# Patient Record
Sex: FEMALE | Race: WHITE | Employment: FULL TIME | URBAN - METROPOLITAN AREA
[De-identification: names, ages, dates, MRNs, and addresses within clinical notes are randomized per-mention and may not be internally consistent; named-entity substitution may affect disease eponyms.]

---

## 2017-08-15 ENCOUNTER — HOSPITAL ENCOUNTER (OUTPATIENT)
Dept: MAMMOGRAPHY | Age: 52
Discharge: HOME | End: 2017-08-15
Attending: OBSTETRICS & GYNECOLOGY
Payer: COMMERCIAL

## 2017-08-15 ENCOUNTER — HOSPITAL ENCOUNTER (OUTPATIENT)
Dept: ULTRASOUND IMAGING | Age: 52
Discharge: HOME | End: 2017-08-15
Attending: OBSTETRICS & GYNECOLOGY
Payer: COMMERCIAL

## 2017-08-15 DIAGNOSIS — Z12.31 VISIT FOR SCREENING MAMMOGRAM: ICD-10-CM

## 2017-08-15 PROCEDURE — 76642 ULTRASOUND BREAST LIMITED: CPT

## 2017-08-15 PROCEDURE — 77062 BREAST TOMOSYNTHESIS BI: CPT

## 2018-09-19 ENCOUNTER — APPOINTMENT (OUTPATIENT)
Dept: GENERAL RADIOLOGY | Age: 53
End: 2018-09-19
Attending: PHYSICIAN ASSISTANT
Payer: COMMERCIAL

## 2018-09-19 ENCOUNTER — HOSPITAL ENCOUNTER (EMERGENCY)
Age: 53
Discharge: HOME | End: 2018-09-19
Attending: EMERGENCY MEDICINE
Payer: COMMERCIAL

## 2018-09-19 VITALS
BODY MASS INDEX: 20.09 KG/M2 | DIASTOLIC BLOOD PRESSURE: 39 MMHG | WEIGHT: 128 LBS | TEMPERATURE: 99.4 F | SYSTOLIC BLOOD PRESSURE: 119 MMHG | RESPIRATION RATE: 20 BRPM | OXYGEN SATURATION: 96 % | HEART RATE: 64 BPM | HEIGHT: 67 IN

## 2018-09-19 DIAGNOSIS — S62.612A CLOSED DISPLACED FRACTURE OF PROXIMAL PHALANX OF RIGHT MIDDLE FINGER, INITIAL ENCOUNTER: Primary | ICD-10-CM

## 2018-09-19 PROCEDURE — A4565 SLINGS: HCPCS

## 2018-09-19 PROCEDURE — 73130 X-RAY EXAM OF HAND: CPT

## 2018-09-19 PROCEDURE — 74011250637 HC RX REV CODE- 250/637: Performed by: PHYSICIAN ASSISTANT

## 2018-09-19 PROCEDURE — 99284 EMERGENCY DEPT VISIT MOD MDM: CPT

## 2018-09-19 PROCEDURE — 75810000053 HC SPLINT APPLICATION

## 2018-09-19 RX ORDER — BUPIVACAINE HYDROCHLORIDE 2.5 MG/ML
10 INJECTION, SOLUTION EPIDURAL; INFILTRATION; INTRACAUDAL ONCE
Status: DISCONTINUED | OUTPATIENT
Start: 2018-09-19 | End: 2018-09-19

## 2018-09-19 RX ORDER — HYDROCODONE BITARTRATE AND ACETAMINOPHEN 5; 325 MG/1; MG/1
1 TABLET ORAL
Status: COMPLETED | OUTPATIENT
Start: 2018-09-19 | End: 2018-09-19

## 2018-09-19 RX ORDER — HYDROCODONE BITARTRATE AND ACETAMINOPHEN 5; 325 MG/1; MG/1
1 TABLET ORAL
Qty: 20 TAB | Refills: 0 | Status: SHIPPED | OUTPATIENT
Start: 2018-09-19

## 2018-09-19 RX ADMIN — HYDROCODONE BITARTRATE AND ACETAMINOPHEN 1 TABLET: 5; 325 TABLET ORAL at 20:38

## 2018-09-20 NOTE — ED PROVIDER NOTES
EMERGENCY DEPARTMENT HISTORY AND PHYSICAL EXAM    8:23 PM      Date: 9/19/2018  Patient Name: Denisse Del Cid    History of Presenting Illness     Chief Complaint   Patient presents with    Hand Injury       History Provided By: Patient    Chief Complaint: right hand injury, fall  Duration:  Hours  Timing:  Acute  Location:   Quality: Aching  Severity: Moderate  Modifying Factors: tylenol pta  Associated Symptoms: denies any other associated signs or symptoms      Additional History (Context):Britney Hooper is a 47 y.o. female who presents to the emergency department for evaluation of right third finger and hand pain after she fell forward while holding a blower. Finger is now swollen and she is experiencing intense pain. She took tylenol and applied ice pta. Pt denies any fevers or chills, headache, dizziness or light headedness, ENT issues, CP or discomfort, SOB, cough, n/v/d/c, abd pain, back pain, diaphoresis, melena/hematochezia, dysuria, hematuria, frequency, focal weakness/numbness/tingling, or rash. Patient has no other complaints at this time. PCP:  Stephy Riley MD        Current Facility-Administered Medications   Medication Dose Route Frequency Provider Last Rate Last Dose    bupivacaine (PF) (MARCAINE) 0.25 % (2.5 mg/mL) injection 25 mg  10 mL SubCUTAneous ONCE Alejandra Valdivia PA-C         Current Outpatient Prescriptions   Medication Sig Dispense Refill    HYDROcodone-acetaminophen (NORCO) 5-325 mg per tablet Take 1 Tab by mouth every four (4) hours as needed for Pain. Max Daily Amount: 6 Tabs.  20 Tab 0       Past History     Past Medical History:  Past Medical History:   Diagnosis Date    Acute frontal sinusitis     Adrenal mass (HCC)     Chronic kidney disease     Sciatica     Shortness of breath        Past Surgical History:  Past Surgical History:   Procedure Laterality Date    HX TONSILLECTOMY         Family History:  Family History   Problem Relation Age of Onset    Diabetes Mother     Heart Disease Father     Cancer Maternal Grandfather     Heart Disease Maternal Grandfather     Diabetes Maternal Grandfather     Cancer Paternal Grandmother     Lung Disease Paternal Grandmother      TB       Social History:  Social History   Substance Use Topics    Smoking status: Never Smoker    Smokeless tobacco: Never Used    Alcohol use Yes       Allergies:  No Known Allergies      Review of Systems       Review of Systems   Constitutional: Negative for chills and fever. HENT: Negative for congestion, rhinorrhea and sore throat. Respiratory: Negative for cough and shortness of breath. Cardiovascular: Negative for chest pain. Gastrointestinal: Negative for abdominal pain, blood in stool, constipation, diarrhea, nausea and vomiting. Genitourinary: Negative for dysuria, frequency and hematuria. Musculoskeletal: Positive for arthralgias and joint swelling. Negative for back pain and myalgias. Skin: Negative for rash and wound. Neurological: Negative for dizziness and headaches. Physical Exam     Visit Vitals    BP (!) 119/39 (BP 1 Location: Left arm, BP Patient Position: At rest)    Pulse 64    Temp 99.4 °F (37.4 °C)    Resp 20    Ht 5' 7\" (1.702 m)    Wt 58.1 kg (128 lb)    SpO2 96%    BMI 20.05 kg/m2         Physical Exam   Constitutional: She is oriented to person, place, and time. She appears well-developed and well-nourished. No distress. HENT:   Head: Normocephalic and atraumatic. Eyes: Conjunctivae are normal.   Neck: Normal range of motion. Neck supple. Cardiovascular: Normal rate, regular rhythm and normal heart sounds. Pulmonary/Chest: Effort normal and breath sounds normal. No respiratory distress. She has no wheezes. She has no rales. She exhibits no tenderness. Musculoskeletal: She exhibits edema and tenderness. She exhibits no deformity.    TTP with moderate edema noted to right middle finger with ecchymosis developing over MCP. Right carpal-MC joint of thumb has ecchymosis and tenderness as well. Pt is neurovascularly intact distally with cap refill < 3 seconds and intact sensation. Significantly reduced ROM 2/2 to pain. Neurological: She is alert and oriented to person, place, and time. She has normal reflexes. Skin: Skin is warm and dry. She is not diaphoretic. Psychiatric: She has a normal mood and affect. Nursing note and vitals reviewed. Diagnostic Study Results     Labs -  No results found for this or any previous visit (from the past 12 hour(s)). Radiologic Studies -   No results found. Medical Decision Making   I am the first provider for this patient. I reviewed the vital signs, available nursing notes, past medical history, past surgical history, family history and social history. Vital Signs-Reviewed the patient's vital signs. Pulse Oximetry Analysis -  96% on room air (Interpretation)    Records Reviewed: Nursing Notes and Old Medical Records (Time of Review: 8:23 PM)    ED Course: Progress Notes, Reevaluation, and Consults:      Provider Notes (Medical Decision Making):   Differential Diagnosis: fracture, dislocation, tendinous/ligamentous tear/strain, contusion, OA, RA, gout    Plan:  Pt presents ambulatory, well-hydrated, non-toxic in appearance, with normal vitals. XR shows comminuted, mildly angulated right proximal phalanx fracture. Pt discussed with personal Orthopedic surgeon, Dr. Lubna Weeks, who does not recommend reduction. He would like her to have a volar hand splint and he will see her in the morning. Will DC home with norco, splint, sling, and ortho follow-up. At this time, patient is stable and appropriate for discharge home. Patient demonstrates understanding of current diagnoses and is in agreement with the treatment plan.   They are advised that while the likelihood of serious underlying condition is low at this point given the evaluation performed today, we cannot fully rule it out. They are advised to immediately return with any new symptoms or worsening of current condition. All questions have been answered. Patient is given educational material regarding their diagnoses, including danger symptoms and when to return to the ED. Pt strongly urged to follow-up with Ortho. Diagnosis     Clinical Impression:   1. Closed displaced fracture of proximal phalanx of right middle finger, initial encounter        Disposition: IN Home    Follow-up Information     Follow up With Details Comments Contact Info    Luc Varghese MD Go to As scheduled tomorrow [facility]  [place] [address] EMERGENCY DEPT Go to As needed, If symptoms worsen [id number] [facility]  [phone]           Patient's Medications   Start Taking    HYDROCODONE-ACETAMINOPHEN (NORCO) 5-325 MG PER TABLET    Take 1 Tab by mouth every four (4) hours as needed for Pain. Max Daily Amount: 6 Tabs. Continue Taking    No medications on file   These Medications have changed    No medications on file   Stop Taking    AZITHROMYCIN (ZITHROMAX) 250 MG TABLET    Take 2 tablets today, then take 1 tablet daily    CARBAMIDE PEROXIDE (DEBROX) 6.5 % OTIC SOLUTION    Administer 5 Drops into each ear two (2) times a day.     FLUTICASONE (FLONASE) 50 MCG/ACTUATION NASAL SPRAY    nightly.     _______________________________

## 2018-09-20 NOTE — ED TRIAGE NOTES
C/o injury to right hand today following fall while holding yard blower. Bruising and swelling noted to right hand with emphasis on right 3rd finger.

## 2018-09-20 NOTE — DISCHARGE INSTRUCTIONS
Finger Fracture: Care Instructions  Your Care Instructions    Breaks in the bones of the finger usually heal well in about 3 to 4 weeks. The pain and swelling from a broken finger can last for weeks. But it should steadily improve, starting a few days after you break it. It is very important that you wear and take care of the cast or splint exactly as your doctor tells you to so that your finger heals properly and does not end up crooked. Wearing a splint may interfere with your normal activities. Ask for help with daily tasks if you need it. You heal best when you take good care of yourself. Eat a variety of healthy foods, and don't smoke. Follow-up care is a key part of your treatment and safety. Be sure to make and go to all appointments, and call your doctor if you are having problems. It's also a good idea to know your test results and keep a list of the medicines you take. How can you care for yourself at home? · If your doctor put a splint on your finger, wear the splint exactly as directed. Do not remove it until your doctor says that you can. · Keep your hand raised above the level of your heart as much as you can. This will help reduce swelling. · Put ice or a cold pack on your finger for 10 to 20 minutes at a time. Try to do this every 1 to 2 hours for the next 3 days (when you are awake) or until the swelling goes down. Put a thin cloth between the ice and your skin. Keep the splint dry. · Be safe with medicines. Take pain medicines exactly as directed. ¨ If the doctor gave you a prescription medicine for pain, take it as prescribed. ¨ If you are not taking a prescription pain medicine, ask your doctor if you can take an over-the-counter medicine. When should you call for help? Call 911 anytime you think you may need emergency care.  For example, call if:    · Your finger is cool or pale or changes color.    Call your doctor now or seek immediate medical care if:    · Your pain gets much worse.     · You have tingling, weakness, or numbness in your finger.     · You have signs of infection, such as:  ¨ Increased pain, swelling, warmth, or redness. ¨ Red streaks leading from the area. ¨ Pus draining from the area. ¨ Swollen lymph nodes in your neck, armpits, or groin. ¨ A fever.    Watch closely for changes in your health, and be sure to contact your doctor if:    · Your finger is not steadily improving. Where can you learn more? Go to http://heriberto-natalio.info/. Enter Q497 in the search box to learn more about \"Finger Fracture: Care Instructions. \"  Current as of: November 29, 2017  Content Version: 11.7  © 6081-4672 [employer], [employer]. Care instructions adapted under license by [employer] (which disclaims liability or warranty for this information). If you have questions about a medical condition or this instruction, always ask your healthcare professional. [place] [address] any warranty or liability for your use of this information.

## 2018-09-20 NOTE — ED NOTES
Patient armband removed and shreddedI have reviewed discharge instructions with the patient. The patient verbalized understanding.  rx x 1 given

## 2021-10-18 ENCOUNTER — TRANSCRIBE ORDER (OUTPATIENT)
Dept: SCHEDULING | Age: 56
End: 2021-10-18

## 2021-10-18 DIAGNOSIS — Z12.31 SCREENING MAMMOGRAM FOR HIGH-RISK PATIENT: Primary | ICD-10-CM

## 2021-10-21 ENCOUNTER — HOSPITAL ENCOUNTER (OUTPATIENT)
Dept: MAMMOGRAPHY | Age: 56
Discharge: HOME | End: 2021-10-21
Attending: OBSTETRICS & GYNECOLOGY
Payer: COMMERCIAL

## 2021-10-21 DIAGNOSIS — Z12.31 SCREENING MAMMOGRAM FOR HIGH-RISK PATIENT: ICD-10-CM

## 2021-10-21 PROCEDURE — 77063 BREAST TOMOSYNTHESIS BI: CPT

## 2022-11-02 ENCOUNTER — HOSPITAL ENCOUNTER (OUTPATIENT)
Dept: GENERAL RADIOLOGY | Age: 57
Discharge: HOME | End: 2022-11-02
Payer: COMMERCIAL

## 2022-11-02 ENCOUNTER — OFFICE VISIT (OUTPATIENT)
Dept: ORTHOPEDIC SURGERY | Age: 57
End: 2022-11-02
Payer: COMMERCIAL

## 2022-11-02 VITALS — HEIGHT: 67 IN | BODY MASS INDEX: 22.6 KG/M2 | WEIGHT: 144 LBS

## 2022-11-02 DIAGNOSIS — M25.511 RIGHT SHOULDER PAIN, UNSPECIFIED CHRONICITY: Primary | ICD-10-CM

## 2022-11-02 DIAGNOSIS — M25.511 RIGHT SHOULDER PAIN, UNSPECIFIED CHRONICITY: ICD-10-CM

## 2022-11-02 PROCEDURE — 73030 X-RAY EXAM OF SHOULDER: CPT

## 2022-11-02 PROCEDURE — 99203 OFFICE O/P NEW LOW 30 MIN: CPT | Performed by: ORTHOPAEDIC SURGERY

## 2022-11-02 RX ORDER — ALBUTEROL SULFATE 90 UG/1
AEROSOL, METERED RESPIRATORY (INHALATION)
COMMUNITY
Start: 2022-09-21

## 2022-11-02 RX ORDER — TRAMADOL HYDROCHLORIDE 50 MG/1
50 TABLET ORAL
COMMUNITY
End: 2022-11-11 | Stop reason: ALTCHOICE

## 2022-11-02 RX ORDER — ZALEPLON 5 MG/1
CAPSULE ORAL
COMMUNITY
Start: 2022-08-05

## 2022-11-02 RX ORDER — CLARITHROMYCIN 500 MG/1
500 TABLET, FILM COATED ORAL EVERY 12 HOURS
COMMUNITY
End: 2022-11-11 | Stop reason: ALTCHOICE

## 2022-11-02 NOTE — PATIENT INSTRUCTIONS
Shoulder Pain: Care Instructions  Your Care Instructions     You can hurt your shoulder by using it too much during an activity, such as fishing or baseball. It can also happen as part of the everyday wear and tear of getting older. Shoulder injuries can be slow to heal, but your shoulder should get better with time. Your doctor may recommend a sling to rest your shoulder. If you have injured your shoulder, you may need testing and treatment. Follow-up care is a key part of your treatment and safety. Be sure to make and go to all appointments, and call your doctor if you are having problems. It's also a good idea to know your test results and keep a list of the medicines you take. How can you care for yourself at home? Take pain medicines exactly as directed. If the doctor gave you a prescription medicine for pain, take it as prescribed. If you are not taking a prescription pain medicine, ask your doctor if you can take an over-the-counter medicine. Do not take two or more pain medicines at the same time unless the doctor told you to. Many pain medicines contain acetaminophen, which is Tylenol. Too much acetaminophen (Tylenol) can be harmful. If your doctor recommends that you wear a sling, use it as directed. Do not take it off before your doctor tells you to. Put ice or a cold pack on the sore area for 10 to 20 minutes at a time. Put a thin cloth between the ice and your skin. If there is no swelling, you can put moist heat, a heating pad, or a warm cloth on your shoulder. Some doctors suggest alternating between hot and cold. Rest your shoulder for a few days. If your doctor recommends it, you can then begin gentle exercise of the shoulder, but do not lift anything heavy. When should you call for help? Call 911 anytime you think you may need emergency care. For example, call if:    You have chest pain or pressure. This may occur with:  Sweating. Shortness of breath. Nausea or vomiting.   Pain that spreads from the chest to the neck, jaw, or one or both shoulders or arms. Dizziness or lightheadedness. A fast or uneven pulse. After calling 911, chew 1 adult-strength aspirin. Wait for an ambulance. Do not try to drive yourself. Your arm or hand is cool or pale or changes color. Call your doctor now or seek immediate medical care if:    You have signs of infection, such as: Increased pain, swelling, warmth, or redness in your shoulder. Red streaks leading from a place on your shoulder. Pus draining from an area of your shoulder. Swollen lymph nodes in your neck, armpits, or groin. A fever. Watch closely for changes in your health, and be sure to contact your doctor if:    You cannot use your shoulder. Your shoulder does not get better as expected. Where can you learn more? Go to http://www.gaston.com/  Enter H996 in the search box to learn more about \"Shoulder Pain: Care Instructions. \"  Current as of: March 9, 2022               Content Version: 13.4  © 2006-2022 [employer]. Care instructions adapted under license by [employer] (which disclaims liability or warranty for this information). If you have questions about a medical condition or this instruction, always ask your healthcare professional. [address] any warranty or liability for your use of this information.

## 2022-11-02 NOTE — PROGRESS NOTES
Name: Pool Elizondo    : 1965     Service Dept: [address] [place] [address] [facility]    Chief Complaint   Patient presents with    Shoulder Pain        Visit Vitals  Ht 5' 7\" (1.702 m)   Wt 144 lb (65.3 kg)   LMP 2013   BMI 22.55 kg/m²        No Known Allergies     Current Outpatient Medications   Medication Sig Dispense Refill    albuterol (PROVENTIL HFA, VENTOLIN HFA, PROAIR HFA) 90 mcg/actuation inhaler INHALE 1 SPRAY BY MOUTH EVERY 4 TO 6 HOURS AS NEEDED FOR SHORTNESS OF BREATH OR WHEEZING. clarithromycin (BIAXIN) 500 mg tablet Take 500 mg by mouth every twelve (12) hours. traMADoL (ULTRAM) 50 mg tablet Take 50 mg by mouth every six (6) hours as needed. zaleplon (SONATA) 5 mg capsule TAKE 1 CAPSULE BY MOUTH AT BEDTIME      HYDROcodone-acetaminophen (NORCO) 5-325 mg per tablet Take 1 Tab by mouth every four (4) hours as needed for Pain. Max Daily Amount: 6 Tabs. 20 Tab 0      Patient Active Problem List   Diagnosis Code    Chronic kidney disease N18.9      Family History   Problem Relation Age of Onset    Diabetes Mother     Heart Disease Father     Cancer Maternal Grandfather     Heart Disease Maternal Grandfather     Diabetes Maternal Grandfather     Cancer Paternal Grandmother     Lung Disease Paternal Grandmother         TB      Social History     Socioeconomic History    Marital status:    Tobacco Use    Smoking status: Former     Types: Cigarettes     Quit date: 2010     Years since quittin.8    Smokeless tobacco: Never   Substance and Sexual Activity    Alcohol use:  Yes     Alcohol/week: 3.0 standard drinks     Types: 3 Glasses of wine per week    Drug use: No    Sexual activity: Yes     Partners: Female   Social History Narrative    ** Merged History Encounter **           Past Surgical History:   Procedure Laterality Date    HX OPEN REDUCTION INTERNAL FIXATION      right middle finger    HX TONSILLECTOMY        Past Medical History:   Diagnosis Date    Acute frontal sinusitis     Adrenal mass (HCC)     Chronic kidney disease     Nausea & vomiting     Sciatica     Shortness of breath         I have reviewed and agree with PFSH and ROS and intake form in chart and the record furthermore I have reviewed prior medical record(s) regarding this patients care during this appointment. Review of Systems:   Patient is a pleasant appearing individual, appropriately dressed, well hydrated, well nourished, who is alert, appropriately oriented for age, and in no acute distress with a normal gait and normal affect who does not appear to be in any significant pain. Physical Exam:  Right Shoulder - Positive \"empty can\" test, Grossly neurovascularly intact. Range of motion-Full passive, Active with impingement. No Point tenderness, Strength-significant weakness noted with abduction, some mild crepitation, No skin lesion are identified, No instabilty is noted, No apprehension. No Swelling. Left Shoulder - grossly neurovascularly intact. Full Range of motion, No Weakness with abduction, No Point Tenderness, No skin lesion are identified, No instabilty is noted, No apprehension. No cuts or abrasions are identified. Encounter Diagnoses     ICD-10-CM ICD-9-CM   1. Right shoulder pain, unspecified chronicity  M25.511 719.41       HPI:  The patient is here with a chief complaint of right shoulder pain, throbbing, burning pain status post trauma and fell on the right shoulder. Pain is 7/10. X-rays are unremarkable. Assessment/Plan:  1. Right shoulder possible rotator cuff pathology. I would like to go ahead and get an MRI of the right shoulder. Continue conservative treatment. We will see her back as needed in the meantime and we will go from there. As part of continued conservative pain management options the patient was advised to utilize Tylenol or OTC NSAIDS as long as it is not medically contraindicated.      Return to Office: Follow-up and Dispositions    Return for POST MRI. Scribed by Meghan Dukes LPN as dictated by [facility] PETE Alan MD.  Documentation True and Accepted [facility] PETE Alan MD

## 2022-11-02 NOTE — LETTER
11/4/2022    Patient: Kartik File   YOB: 1965   Date of Visit: 11/2/2022     Joaquin Ramirez [phone] [facility] [id number]  Via Fax: [phone]    Dear Yamileth Renteria DO,      Thank you for referring Ms. Trevor Hammer to [facility] [place] [address] for evaluation. My notes for this consultation are attached. If you have questions, please do not hesitate to call me. I look forward to following your patient along with you.       Sincerely,    José Henning MD

## 2022-11-08 ENCOUNTER — HOSPITAL ENCOUNTER (OUTPATIENT)
Age: 57
Discharge: HOME | End: 2022-11-08
Payer: COMMERCIAL

## 2022-11-08 DIAGNOSIS — M25.511 RIGHT SHOULDER PAIN, UNSPECIFIED CHRONICITY: ICD-10-CM

## 2022-11-08 PROCEDURE — 73221 MRI JOINT UPR EXTREM W/O DYE: CPT

## 2022-11-11 ENCOUNTER — OFFICE VISIT (OUTPATIENT)
Dept: ORTHOPEDIC SURGERY | Age: 57
End: 2022-11-11
Payer: COMMERCIAL

## 2022-11-11 DIAGNOSIS — M25.511 RIGHT SHOULDER PAIN, UNSPECIFIED CHRONICITY: ICD-10-CM

## 2022-11-11 DIAGNOSIS — M75.121 COMPLETE TEAR OF RIGHT ROTATOR CUFF, UNSPECIFIED WHETHER TRAUMATIC: Primary | ICD-10-CM

## 2022-11-11 DIAGNOSIS — M75.121 COMPLETE TEAR OF RIGHT ROTATOR CUFF, UNSPECIFIED WHETHER TRAUMATIC: ICD-10-CM

## 2022-11-11 PROCEDURE — 99214 OFFICE O/P EST MOD 30 MIN: CPT | Performed by: ORTHOPAEDIC SURGERY

## 2022-11-11 NOTE — PATIENT INSTRUCTIONS
Rotator Cuff Injury: Care Instructions  Overview     The rotator cuff is a group of tendons and muscles around the shoulder that keeps the upper arm bone in place. It keeps the shoulder joint stable and allows you to raise and rotate your arm. Damage to the rotator cuff can be caused by overuse, a fall, or a direct blow to the shoulder area, which can tear the tendons. Over time, everyday wear can damage the tendons and make injury more likely. Treatment can depend on the type and amount of damage to the tendons. Your doctor may have you try physical therapy and exercise first. If physical therapy does not help, your doctor may recommend surgery. Follow-up care is a key part of your treatment and safety. Be sure to make and go to all appointments, and call your doctor if you are having problems. It's also a good idea to know your test results and keep a list of the medicines you take. How can you care for yourself at home? Rest your shoulder as much as you can. If your doctor put your arm in a sling or shoulder immobilizer, wear it as directed. Do not take it off before your doctor tells you to. If it is too tight, loosen it. Be safe with medicines. Read and follow all instructions on the label. If the doctor gave you a prescription medicine for pain, take it as prescribed. If you are not taking a prescription pain medicine, ask your doctor if you can take an over-the-counter medicine. Put ice or a cold pack on your shoulder for 10 to 20 minutes at a time. Try to do this every 1 to 2 hours for the next 3 days (when you are awake). Put a thin cloth between the ice pack and your skin. After 2 or 3 days, if you don't have swelling, apply heat. Put a warm water bottle, a heating pad set on low, or a warm cloth on your shoulder. Do not go to sleep with a heating pad on your skin. Put a thin cloth between the heating pad and your skin.   While holding a warm, wet towel on your shoulder, lean forward so your arm hangs freely, and gently swing your arm back and forth like a pendulum. You also can do this standing under a warm shower. Do not do anything that makes your pain worse. Follow your doctor's advice about whether you need physical therapy. When should you call for help? Call your doctor now or seek immediate medical care if:    You have severe pain. You cannot move your shoulder or arm. You have tingling or numbness in your arm or hand. Your arm or hand is cool or pale. Watch closely for changes in your health, and be sure to contact your doctor if:    Your pain gets worse. You have new or worse swelling in your arm or hand. You do not get better as expected. Where can you learn more? Go to http://www.gaston.com/  Enter D027 in the search box to learn more about \"Rotator Cuff Injury: Care Instructions. \"  Current as of: March 9, 2022               Content Version: 13.4  © 1576-7017 [employer]. Care instructions adapted under license by [employer] (which disclaims liability or warranty for this information). If you have questions about a medical condition or this instruction, always ask your healthcare professional. [place] [address] any warranty or liability for your use of this information.

## 2022-11-11 NOTE — PROGRESS NOTES
Name: Irina Palacio    : 1965     Service Dept: [address] [place] [address]    Chief Complaint   Patient presents with    Shoulder Pain        Visit Vitals  LMP 2013        No Known Allergies     Current Outpatient Medications   Medication Sig Dispense Refill    albuterol (PROVENTIL HFA, VENTOLIN HFA, PROAIR HFA) 90 mcg/actuation inhaler INHALE 1 SPRAY BY MOUTH EVERY 4 TO 6 HOURS AS NEEDED FOR SHORTNESS OF BREATH OR WHEEZING. zaleplon (SONATA) 5 mg capsule TAKE 1 CAPSULE BY MOUTH AT BEDTIME        Patient Active Problem List   Diagnosis Code    Chronic kidney disease N18.9      Family History   Problem Relation Age of Onset    Diabetes Mother     Heart Disease Father     Cancer Maternal Grandfather     Heart Disease Maternal Grandfather     Diabetes Maternal Grandfather     Cancer Paternal Grandmother     Lung Disease Paternal Grandmother         TB      Social History     Socioeconomic History    Marital status:    Tobacco Use    Smoking status: Former     Types: Cigarettes     Quit date: 2010     Years since quittin.8    Smokeless tobacco: Never   Substance and Sexual Activity    Alcohol use: Yes     Alcohol/week: 3.0 standard drinks     Types: 3 Glasses of wine per week    Drug use: No    Sexual activity: Yes     Partners: Female   Social History Narrative    ** Merged History Encounter **           Past Surgical History:   Procedure Laterality Date    HX OPEN REDUCTION INTERNAL FIXATION      right middle finger    HX TONSILLECTOMY        Past Medical History:   Diagnosis Date    Acute frontal sinusitis     Adrenal mass (HCC)     Chronic kidney disease     Nausea & vomiting     Sciatica     Shortness of breath         I have reviewed and agree with PFSH and ROS and intake form in chart and the record furthermore I have reviewed prior medical record(s) regarding this patients care during this appointment.      Review of Systems:   Patient is a pleasant appearing individual, appropriately dressed, well hydrated, well nourished, who is alert, appropriately oriented for age, and in no acute distress with a normal gait and normal affect who does not appear to be in any significant pain. Physical Exam:  Right Shoulder - Positive \"empty can\" test, Grossly neurovascularly intact. Range of motion-Full passive, Active with impingement. No Point tenderness, Strength-significant weakness noted with abduction, some mild crepitation, No skin lesion are identified, No instabilty is noted, No apprehension. No Swelling. Left Shoulder - grossly neurovascularly intact. Full Range of motion, No Weakness with abduction, No Point Tenderness, No skin lesion are identified, No instabilty is noted, No apprehension. No cuts or abrasions are identified. Encounter Diagnoses     ICD-10-CM ICD-9-CM   1. Complete tear of right rotator cuff, unspecified whether traumatic  M75.121 727.61   2. Right shoulder pain, unspecified chronicity  M25.511 719.41       HPI:  The patient is here with a chief complaint of right shoulder pain, throbbing burning pain. It has been the same. Pain is 5/10. X-rays of the right shoulder are unremarkable. MRI is positive for full-thickness rotator cuff tear. Assessment/Plan:  Plan will be for right shoulder arthroscopy, arthroscopic rotator cuff repair, decompression, Yamilka, extensive debridement, suprascapular nerve block, basic blood work, EKG, no medical clearance. We will go from there. As part of continued conservative pain management options the patient was advised to utilize Tylenol or OTC NSAIDS as long as it is not medically contraindicated. Return to Office: Follow-up and Dispositions    Return for schedule for surgery. Scribed by Lissette Rutherford LPN as dictated by [facility] PETE Crawford MD.  Documentation True and Accepted Nikita Crawford MD

## 2022-11-11 NOTE — LETTER
11/14/2022    Patient: Bertin Marion   YOB: 1965   Date of Visit: 11/11/2022     [address] [facility] [address]  Via Fax: [phone]    Dear Ana Miller DO,      Thank you for referring Ms. Destinee Vásquez to [address] [place] [address] for evaluation. My notes for this consultation are attached. If you have questions, please do not hesitate to call me. I look forward to following your patient along with you.       Sincerely,    Caleb Patton MD

## 2022-11-22 ENCOUNTER — TRANSCRIBE ORDER (OUTPATIENT)
Dept: SCHEDULING | Age: 57
End: 2022-11-22

## 2022-11-22 DIAGNOSIS — Z87.891 HISTORY OF TOBACCO USE: ICD-10-CM

## 2022-11-22 DIAGNOSIS — J92.9 PLEURAL THICKENING: Primary | ICD-10-CM

## 2022-11-30 ENCOUNTER — HOSPITAL ENCOUNTER (OUTPATIENT)
Dept: LAB | Age: 57
Discharge: HOME | End: 2022-11-30
Payer: COMMERCIAL

## 2022-11-30 ENCOUNTER — HOSPITAL ENCOUNTER (OUTPATIENT)
Dept: GENERAL RADIOLOGY | Age: 57
End: 2022-11-30
Attending: FAMILY MEDICINE
Payer: COMMERCIAL

## 2022-11-30 ENCOUNTER — HOSPITAL ENCOUNTER (OUTPATIENT)
Dept: CT IMAGING | Age: 57
Discharge: HOME | End: 2022-11-30
Attending: FAMILY MEDICINE
Payer: COMMERCIAL

## 2022-11-30 DIAGNOSIS — J92.9 PLEURAL THICKENING: ICD-10-CM

## 2022-11-30 DIAGNOSIS — M75.121 COMPLETE TEAR OF RIGHT ROTATOR CUFF, UNSPECIFIED WHETHER TRAUMATIC: ICD-10-CM

## 2022-11-30 DIAGNOSIS — M25.511 RIGHT SHOULDER PAIN, UNSPECIFIED CHRONICITY: ICD-10-CM

## 2022-11-30 DIAGNOSIS — Z87.891 HISTORY OF TOBACCO USE: ICD-10-CM

## 2022-11-30 LAB
ANION GAP SERPL CALC-SCNC: 3 MMOL/L (ref 3–18)
ATRIAL RATE: 72 BPM
BASOPHILS # BLD: 0 K/UL (ref 0–0.1)
BASOPHILS NFR BLD: 1 % (ref 0–2)
BUN SERPL-MCNC: 14 MG/DL (ref 7–18)
BUN/CREAT SERPL: 15 (ref 12–20)
CALCIUM SERPL-MCNC: 9.6 MG/DL (ref 8.5–10.1)
CALCULATED P AXIS, ECG09: 64 DEGREES
CALCULATED R AXIS, ECG10: 61 DEGREES
CALCULATED T AXIS, ECG11: 61 DEGREES
CHLORIDE SERPL-SCNC: 106 MMOL/L (ref 100–111)
CO2 SERPL-SCNC: 31 MMOL/L (ref 21–32)
CREAT SERPL-MCNC: 0.91 MG/DL (ref 0.6–1.3)
DIAGNOSIS, 93000: NORMAL
DIFFERENTIAL METHOD BLD: NORMAL
EOSINOPHIL # BLD: 0.3 K/UL (ref 0–0.4)
EOSINOPHIL NFR BLD: 5 % (ref 0–5)
ERYTHROCYTE [DISTWIDTH] IN BLOOD BY AUTOMATED COUNT: 13.1 % (ref 11.6–14.5)
GLUCOSE SERPL-MCNC: 98 MG/DL (ref 74–99)
HCT VFR BLD AUTO: 43.7 % (ref 35–45)
HGB BLD-MCNC: 14.6 G/DL (ref 12–16)
IMM GRANULOCYTES # BLD AUTO: 0 K/UL (ref 0–0.04)
IMM GRANULOCYTES NFR BLD AUTO: 0 % (ref 0–0.5)
LYMPHOCYTES # BLD: 2.4 K/UL (ref 0.9–3.6)
LYMPHOCYTES NFR BLD: 40 % (ref 21–52)
MCH RBC QN AUTO: 31.5 PG (ref 24–34)
MCHC RBC AUTO-ENTMCNC: 33.4 G/DL (ref 31–37)
MCV RBC AUTO: 94.4 FL (ref 78–100)
MONOCYTES # BLD: 0.4 K/UL (ref 0.05–1.2)
MONOCYTES NFR BLD: 7 % (ref 3–10)
NEUTS SEG # BLD: 2.8 K/UL (ref 1.8–8)
NEUTS SEG NFR BLD: 47 % (ref 40–73)
NRBC # BLD: 0 K/UL (ref 0–0.01)
NRBC BLD-RTO: 0 PER 100 WBC
P-R INTERVAL, ECG05: 128 MS
PLATELET # BLD AUTO: 293 K/UL (ref 135–420)
PMV BLD AUTO: 10 FL (ref 9.2–11.8)
POTASSIUM SERPL-SCNC: 4.1 MMOL/L (ref 3.5–5.5)
Q-T INTERVAL, ECG07: 390 MS
QRS DURATION, ECG06: 70 MS
QTC CALCULATION (BEZET), ECG08: 427 MS
RBC # BLD AUTO: 4.63 M/UL (ref 4.2–5.3)
SODIUM SERPL-SCNC: 140 MMOL/L (ref 136–145)
VENTRICULAR RATE, ECG03: 72 BPM
WBC # BLD AUTO: 5.9 K/UL (ref 4.6–13.2)

## 2022-11-30 PROCEDURE — 93005 ELECTROCARDIOGRAM TRACING: CPT

## 2022-11-30 PROCEDURE — 36415 COLL VENOUS BLD VENIPUNCTURE: CPT

## 2022-11-30 PROCEDURE — 85025 COMPLETE CBC W/AUTO DIFF WBC: CPT

## 2022-11-30 PROCEDURE — 71250 CT THORAX DX C-: CPT

## 2022-11-30 PROCEDURE — 80048 BASIC METABOLIC PNL TOTAL CA: CPT

## 2022-12-05 ENCOUNTER — ANESTHESIA EVENT (OUTPATIENT)
Dept: SURGERY | Age: 57
End: 2022-12-05
Payer: COMMERCIAL

## 2022-12-08 ENCOUNTER — HOSPITAL ENCOUNTER (OUTPATIENT)
Dept: PREADMISSION TESTING | Age: 57
Discharge: HOME | End: 2022-12-08

## 2022-12-08 ENCOUNTER — OFFICE VISIT (OUTPATIENT)
Dept: ORTHOPEDIC SURGERY | Age: 57
End: 2022-12-08
Payer: COMMERCIAL

## 2022-12-08 DIAGNOSIS — M75.121 COMPLETE TEAR OF RIGHT ROTATOR CUFF, UNSPECIFIED WHETHER TRAUMATIC: Primary | ICD-10-CM

## 2022-12-08 PROCEDURE — 99214 OFFICE O/P EST MOD 30 MIN: CPT | Performed by: ORTHOPAEDIC SURGERY

## 2022-12-08 RX ORDER — ONDANSETRON 4 MG/1
4 TABLET, ORALLY DISINTEGRATING ORAL
Qty: 20 TABLET | Refills: 0 | Status: SHIPPED | OUTPATIENT
Start: 2022-12-08

## 2022-12-08 RX ORDER — OXYCODONE AND ACETAMINOPHEN 5; 325 MG/1; MG/1
1 TABLET ORAL
Qty: 30 TABLET | Refills: 0 | Status: SHIPPED | OUTPATIENT
Start: 2022-12-08 | End: 2022-12-22

## 2022-12-08 NOTE — PATIENT INSTRUCTIONS
Rotator Cuff Injury: Care Instructions  Overview     The rotator cuff is a group of tendons and muscles around the shoulder that keeps the upper arm bone in place. It keeps the shoulder joint stable and allows you to raise and rotate your arm. Damage to the rotator cuff can be caused by overuse, a fall, or a direct blow to the shoulder area, which can tear the tendons. Over time, everyday wear can damage the tendons and make injury more likely. Treatment can depend on the type and amount of damage to the tendons. Your doctor may have you try physical therapy and exercise first. If physical therapy does not help, your doctor may recommend surgery. Follow-up care is a key part of your treatment and safety. Be sure to make and go to all appointments, and call your doctor if you are having problems. It's also a good idea to know your test results and keep a list of the medicines you take. How can you care for yourself at home? Rest your shoulder as much as you can. If your doctor put your arm in a sling or shoulder immobilizer, wear it as directed. Do not take it off before your doctor tells you to. If it is too tight, loosen it. Be safe with medicines. Read and follow all instructions on the label. If the doctor gave you a prescription medicine for pain, take it as prescribed. If you are not taking a prescription pain medicine, ask your doctor if you can take an over-the-counter medicine. Put ice or a cold pack on your shoulder for 10 to 20 minutes at a time. Try to do this every 1 to 2 hours for the next 3 days (when you are awake). Put a thin cloth between the ice pack and your skin. After 2 or 3 days, if you don't have swelling, apply heat. Put a warm water bottle, a heating pad set on low, or a warm cloth on your shoulder. Do not go to sleep with a heating pad on your skin. Put a thin cloth between the heating pad and your skin.   While holding a warm, wet towel on your shoulder, lean forward so your arm hangs freely, and gently swing your arm back and forth like a pendulum. You also can do this standing under a warm shower. Do not do anything that makes your pain worse. Follow your doctor's advice about whether you need physical therapy. When should you call for help? Call your doctor now or seek immediate medical care if:    You have severe pain. You cannot move your shoulder or arm. You have tingling or numbness in your arm or hand. Your arm or hand is cool or pale. Watch closely for changes in your health, and be sure to contact your doctor if:    Your pain gets worse. You have new or worse swelling in your arm or hand. You do not get better as expected. Where can you learn more? Go to http://www.gaston.com/  Enter D027 in the search box to learn more about \"Rotator Cuff Injury: Care Instructions. \"  Current as of: March 9, 2022               Content Version: 13.4  © 2574-1684 [employer]. Care instructions adapted under license by [employer] (which disclaims liability or warranty for this information). If you have questions about a medical condition or this instruction, always ask your healthcare professional. [place] [address] any warranty or liability for your use of this information.

## 2022-12-08 NOTE — H&P (VIEW-ONLY)
Name: Irina Palacio    : 1965     Service Dept: [address] [facility]    Chief Complaint   Patient presents with    Pre-op Exam    Shoulder Pain        Visit Vitals  LMP 2013        No Known Allergies        Patient Active Problem List   Diagnosis Code    Chronic kidney disease N18.9      Family History   Problem Relation Age of Onset    Diabetes Mother     Heart Disease Father     Cancer Maternal Grandfather     Heart Disease Maternal Grandfather     Diabetes Maternal Grandfather     Cancer Paternal Grandmother     Lung Disease Paternal Grandmother         TB      Social History     Socioeconomic History    Marital status:    Tobacco Use    Smoking status: Former     Types: Cigarettes     Quit date: 2010     Years since quittin.9    Smokeless tobacco: Never   Substance and Sexual Activity    Alcohol use: Yes     Alcohol/week: 3.0 standard drinks     Types: 3 Glasses of wine per week    Drug use: No    Sexual activity: Yes     Partners: Female   Social History Narrative    ** Merged History Encounter **           Past Surgical History:   Procedure Laterality Date    HX OPEN REDUCTION INTERNAL FIXATION      right middle finger    HX TONSILLECTOMY        Past Medical History:   Diagnosis Date    Acute frontal sinusitis     Adrenal mass (HCC)     Chronic kidney disease     Nausea & vomiting     Sciatica     Shortness of breath         I have reviewed and agree with PFSH and ROS and intake form in chart and the record furthermore I have reviewed prior medical record(s) regarding this patients care during this appointment. Review of Systems:   Patient is a pleasant appearing individual, appropriately dressed, well hydrated, well nourished, who is alert, appropriately oriented for age, and in no acute distress with a normal gait and normal affect who does not appear to be in any significant pain.     Physical Exam:  Right Shoulder - Positive \"empty can\" test, Grossly neurovascularly intact. Range of motion-Full passive, Active with impingement. No Point tenderness, Strength-significant weakness noted with abduction, some mild crepitation, No skin lesion are identified, No instabilty is noted, No apprehension. No Swelling. Left Shoulder - grossly neurovascularly intact. Full Range of motion, No Weakness with abduction, No Point Tenderness, No skin lesion are identified, No instabilty is noted, No apprehension. No cuts or abrasions are identified. A consent for surgery will be documented and signed by the patient or a legal guardian. All questions were answered. The risks of surgery were explained to the patient which include but not limited to infection, reoperation, multiple operations, nerve injury, artery injury, tendon injury, poor result, poor wound healing, unforeseen incidence, etc. Patient was told of no guarantees. Patient accepts all risks and benefits    The patient was counseled about the risks of elizabeth Covid-19 during their perioperative period and any recovery window from their procedure. The patient was made aware that elizabeth Covid-19 may worsen their prognosis for recovering from their procedure and lend to a higher morbidity and/or mortality risk. All material risks, benefits, and reasonable alternatives including postponing the procedure were discussed. The patient DOES wish to proceed with their procedure at this time. Encounter Diagnoses     ICD-10-CM ICD-9-CM   1. Complete tear of right rotator cuff, unspecified whether traumatic  M75.121 727.61       HPI:  The patient is here with a chief complaint of right shoulder pain, throbbing, burning pain, diagnosed with full-thickness rotator cuff tear. Assessment/Plan:  Plan will be for right shoulder arthroscopy, arthroscopic rotator cuff repair, decompression, Yamilka, extensive debridement, suprascapular nerve block.   We are going to proceed once the patient gets set up for surgery. Percocet, Zofran, and we will go from there. As part of continued conservative pain management options the patient was advised to utilize Tylenol or OTC NSAIDS as long as it is not medically contraindicated. Return to Office: Follow-up and Dispositions    Return for already scheduled for surgery. Scribed by Tomasa Chauhan LPN as dictated by [facility] PETE Valadez MD.  Documentation True and Accepted [facility] PETE Valadez MD

## 2022-12-08 NOTE — LETTER
12/12/2022    Patient: Kristin Arias   YOB: 1965   Date of Visit: 12/8/2022     [address] [facility] [address]  Via Fax: [phone]    Dear [place] DO,      Thank you for referring Ms. Casey Rodríguez to [address] [place] [address] for evaluation. My notes for this consultation are attached. If you have questions, please do not hesitate to call me. I look forward to following your patient along with you.       Sincerely,    Zaire Pak MD

## 2022-12-08 NOTE — PROGRESS NOTES
Name: Freddy Dee    : 1965     Service Dept: [address] [facility]    Chief Complaint   Patient presents with    Pre-op Exam    Shoulder Pain        Visit Vitals  LMP 2013        No Known Allergies        Patient Active Problem List   Diagnosis Code    Chronic kidney disease N18.9      Family History   Problem Relation Age of Onset    Diabetes Mother     Heart Disease Father     Cancer Maternal Grandfather     Heart Disease Maternal Grandfather     Diabetes Maternal Grandfather     Cancer Paternal Grandmother     Lung Disease Paternal Grandmother         TB      Social History     Socioeconomic History    Marital status:    Tobacco Use    Smoking status: Former     Types: Cigarettes     Quit date: 2010     Years since quittin.9    Smokeless tobacco: Never   Substance and Sexual Activity    Alcohol use: Yes     Alcohol/week: 3.0 standard drinks     Types: 3 Glasses of wine per week    Drug use: No    Sexual activity: Yes     Partners: Female   Social History Narrative    ** Merged History Encounter **           Past Surgical History:   Procedure Laterality Date    HX OPEN REDUCTION INTERNAL FIXATION      right middle finger    HX TONSILLECTOMY        Past Medical History:   Diagnosis Date    Acute frontal sinusitis     Adrenal mass (HCC)     Chronic kidney disease     Nausea & vomiting     Sciatica     Shortness of breath         I have reviewed and agree with PFSH and ROS and intake form in chart and the record furthermore I have reviewed prior medical record(s) regarding this patients care during this appointment. Review of Systems:   Patient is a pleasant appearing individual, appropriately dressed, well hydrated, well nourished, who is alert, appropriately oriented for age, and in no acute distress with a normal gait and normal affect who does not appear to be in any significant pain.     Physical Exam:  Right Shoulder - Positive \"empty can\" test, Grossly neurovascularly intact. Range of motion-Full passive, Active with impingement. No Point tenderness, Strength-significant weakness noted with abduction, some mild crepitation, No skin lesion are identified, No instabilty is noted, No apprehension. No Swelling. Left Shoulder - grossly neurovascularly intact. Full Range of motion, No Weakness with abduction, No Point Tenderness, No skin lesion are identified, No instabilty is noted, No apprehension. No cuts or abrasions are identified. A consent for surgery will be documented and signed by the patient or a legal guardian. All questions were answered. The risks of surgery were explained to the patient which include but not limited to infection, reoperation, multiple operations, nerve injury, artery injury, tendon injury, poor result, poor wound healing, unforeseen incidence, etc. Patient was told of no guarantees. Patient accepts all risks and benefits    The patient was counseled about the risks of elizabeth Covid-19 during their perioperative period and any recovery window from their procedure. The patient was made aware that elizabeth Covid-19 may worsen their prognosis for recovering from their procedure and lend to a higher morbidity and/or mortality risk. All material risks, benefits, and reasonable alternatives including postponing the procedure were discussed. The patient DOES wish to proceed with their procedure at this time. Encounter Diagnoses     ICD-10-CM ICD-9-CM   1. Complete tear of right rotator cuff, unspecified whether traumatic  M75.121 727.61       HPI:  The patient is here with a chief complaint of right shoulder pain, throbbing, burning pain, diagnosed with full-thickness rotator cuff tear. Assessment/Plan:  Plan will be for right shoulder arthroscopy, arthroscopic rotator cuff repair, decompression, Yamilka, extensive debridement, suprascapular nerve block.   We are going to proceed once the patient gets set up for surgery. Percocet, Zofran, and we will go from there. As part of continued conservative pain management options the patient was advised to utilize Tylenol or OTC NSAIDS as long as it is not medically contraindicated. Return to Office: Follow-up and Dispositions    Return for already scheduled for surgery. Scribed by Zoie Simms LPN as dictated by [facility] PETE Gonzáles MD.  Documentation True and Accepted Nikita Gonzáles MD

## 2022-12-12 ENCOUNTER — HOSPITAL ENCOUNTER (OUTPATIENT)
Age: 57
Discharge: HOME | End: 2022-12-12
Attending: ORTHOPAEDIC SURGERY | Admitting: ORTHOPAEDIC SURGERY
Payer: COMMERCIAL

## 2022-12-12 ENCOUNTER — ANESTHESIA (OUTPATIENT)
Dept: SURGERY | Age: 57
End: 2022-12-12
Payer: COMMERCIAL

## 2022-12-12 VITALS
SYSTOLIC BLOOD PRESSURE: 118 MMHG | OXYGEN SATURATION: 97 % | BODY MASS INDEX: 23.15 KG/M2 | RESPIRATION RATE: 18 BRPM | HEART RATE: 76 BPM | DIASTOLIC BLOOD PRESSURE: 54 MMHG | TEMPERATURE: 97.2 F | HEIGHT: 67 IN | WEIGHT: 147.5 LBS

## 2022-12-12 PROBLEM — M75.100 ROTATOR CUFF TEAR: Status: ACTIVE | Noted: 2022-12-12

## 2022-12-12 PROCEDURE — 76210000021 HC REC RM PH II 0.5 TO 1 HR: Performed by: ORTHOPAEDIC SURGERY

## 2022-12-12 PROCEDURE — 77030011390 HC GRSP SUT IDL J&J -C: Performed by: ORTHOPAEDIC SURGERY

## 2022-12-12 PROCEDURE — 76942 ECHO GUIDE FOR BIOPSY: CPT | Performed by: NURSE ANESTHETIST, CERTIFIED REGISTERED

## 2022-12-12 PROCEDURE — 77030016678 HC BUR SHV4 S&N -B: Performed by: ORTHOPAEDIC SURGERY

## 2022-12-12 PROCEDURE — 77030026438 HC STYL ET INTUB CARD -A: Performed by: NURSE ANESTHETIST, CERTIFIED REGISTERED

## 2022-12-12 PROCEDURE — 77030010428: Performed by: ORTHOPAEDIC SURGERY

## 2022-12-12 PROCEDURE — 74011000250 HC RX REV CODE- 250: Performed by: NURSE ANESTHETIST, CERTIFIED REGISTERED

## 2022-12-12 PROCEDURE — 74011250636 HC RX REV CODE- 250/636: Performed by: NURSE ANESTHETIST, CERTIFIED REGISTERED

## 2022-12-12 PROCEDURE — 77030018673: Performed by: ORTHOPAEDIC SURGERY

## 2022-12-12 PROCEDURE — 74011000250 HC RX REV CODE- 250: Performed by: ORTHOPAEDIC SURGERY

## 2022-12-12 PROCEDURE — 77030013079 HC BLNKT BAIR HGGR 3M -A: Performed by: NURSE ANESTHETIST, CERTIFIED REGISTERED

## 2022-12-12 PROCEDURE — C1713 ANCHOR/SCREW BN/BN,TIS/BN: HCPCS | Performed by: ORTHOPAEDIC SURGERY

## 2022-12-12 PROCEDURE — 77030003601 HC NDL NRV BLK BBMI -A: Performed by: NURSE ANESTHETIST, CERTIFIED REGISTERED

## 2022-12-12 PROCEDURE — 77030006877 HC BLD SHV FLL RAD S&N -B: Performed by: ORTHOPAEDIC SURGERY

## 2022-12-12 PROCEDURE — 77030033005 HC TBNG ARTHSC PMP STRY -B: Performed by: ORTHOPAEDIC SURGERY

## 2022-12-12 PROCEDURE — 64450 NJX AA&/STRD OTHER PN/BRANCH: CPT | Performed by: NURSE ANESTHETIST, CERTIFIED REGISTERED

## 2022-12-12 PROCEDURE — 77030040361 HC SLV COMPR DVT MDII -B: Performed by: ORTHOPAEDIC SURGERY

## 2022-12-12 PROCEDURE — 64415 NJX AA&/STRD BRCH PLXS IMG: CPT | Performed by: NURSE ANESTHETIST, CERTIFIED REGISTERED

## 2022-12-12 PROCEDURE — 76210000063 HC OR PH I REC FIRST 0.5 HR: Performed by: ORTHOPAEDIC SURGERY

## 2022-12-12 PROCEDURE — 76010000149 HC OR TIME 1 TO 1.5 HR: Performed by: ORTHOPAEDIC SURGERY

## 2022-12-12 PROCEDURE — 74011250636 HC RX REV CODE- 250/636: Performed by: ORTHOPAEDIC SURGERY

## 2022-12-12 PROCEDURE — 77030008684 HC TU ET CUF COVD -B: Performed by: NURSE ANESTHETIST, CERTIFIED REGISTERED

## 2022-12-12 PROCEDURE — 77030021678 HC GLIDESCP STAT DISP VERT -B: Performed by: NURSE ANESTHETIST, CERTIFIED REGISTERED

## 2022-12-12 PROCEDURE — 2709999900 HC NON-CHARGEABLE SUPPLY: Performed by: ORTHOPAEDIC SURGERY

## 2022-12-12 PROCEDURE — 77030002916 HC SUT ETHLN J&J -A: Performed by: ORTHOPAEDIC SURGERY

## 2022-12-12 PROCEDURE — 76060000033 HC ANESTHESIA 1 TO 1.5 HR: Performed by: ORTHOPAEDIC SURGERY

## 2022-12-12 PROCEDURE — 77030003666 HC NDL SPINAL BD -A: Performed by: ORTHOPAEDIC SURGERY

## 2022-12-12 DEVICE — HEALIX ADVANCE BR 3 SUTURE ANCHOR W/ORTHOCORD TCP/PLGA ABSORBABLE ANCHOR (1) VIOLET (1) BLUE STRAND (1) BLUE STRIPED, SIZE 2 (5 METRIC) ORTHOCORD BRAIDED COMPOSITE SUTURE, 36 INCHES (91CM) 5.5MM
Type: IMPLANTABLE DEVICE | Site: SHOULDER | Status: FUNCTIONAL
Brand: HEALIX ADVANCE ORTHOCORD

## 2022-12-12 RX ORDER — EPHEDRINE SULFATE/0.9% NACL/PF 50 MG/5 ML
SYRINGE (ML) INTRAVENOUS AS NEEDED
Status: DISCONTINUED | OUTPATIENT
Start: 2022-12-12 | End: 2022-12-12 | Stop reason: HOSPADM

## 2022-12-12 RX ORDER — BUPIVACAINE HYDROCHLORIDE 5 MG/ML
INJECTION, SOLUTION EPIDURAL; INTRACAUDAL
Status: SHIPPED | OUTPATIENT
Start: 2022-12-12 | End: 2022-12-12

## 2022-12-12 RX ORDER — OXYCODONE AND ACETAMINOPHEN 5; 325 MG/1; MG/1
2 TABLET ORAL
Status: DISCONTINUED | OUTPATIENT
Start: 2022-12-12 | End: 2022-12-12 | Stop reason: HOSPADM

## 2022-12-12 RX ORDER — SODIUM CHLORIDE 0.9 % (FLUSH) 0.9 %
5-40 SYRINGE (ML) INJECTION AS NEEDED
Status: DISCONTINUED | OUTPATIENT
Start: 2022-12-12 | End: 2022-12-12 | Stop reason: HOSPADM

## 2022-12-12 RX ORDER — SODIUM CHLORIDE 0.9 % (FLUSH) 0.9 %
5-40 SYRINGE (ML) INJECTION EVERY 8 HOURS
Status: DISCONTINUED | OUTPATIENT
Start: 2022-12-12 | End: 2022-12-12 | Stop reason: HOSPADM

## 2022-12-12 RX ORDER — PROPOFOL 10 MG/ML
INJECTION, EMULSION INTRAVENOUS AS NEEDED
Status: DISCONTINUED | OUTPATIENT
Start: 2022-12-12 | End: 2022-12-12 | Stop reason: HOSPADM

## 2022-12-12 RX ORDER — OXYCODONE AND ACETAMINOPHEN 5; 325 MG/1; MG/1
1 TABLET ORAL
Status: DISCONTINUED | OUTPATIENT
Start: 2022-12-12 | End: 2022-12-12 | Stop reason: HOSPADM

## 2022-12-12 RX ORDER — DEXAMETHASONE SODIUM PHOSPHATE 4 MG/ML
INJECTION, SOLUTION INTRA-ARTICULAR; INTRALESIONAL; INTRAMUSCULAR; INTRAVENOUS; SOFT TISSUE AS NEEDED
Status: DISCONTINUED | OUTPATIENT
Start: 2022-12-12 | End: 2022-12-12 | Stop reason: HOSPADM

## 2022-12-12 RX ORDER — ROCURONIUM BROMIDE 10 MG/ML
INJECTION, SOLUTION INTRAVENOUS AS NEEDED
Status: DISCONTINUED | OUTPATIENT
Start: 2022-12-12 | End: 2022-12-12 | Stop reason: HOSPADM

## 2022-12-12 RX ORDER — DIPHENHYDRAMINE HYDROCHLORIDE 50 MG/ML
12.5 INJECTION, SOLUTION INTRAMUSCULAR; INTRAVENOUS
Status: DISCONTINUED | OUTPATIENT
Start: 2022-12-12 | End: 2022-12-12 | Stop reason: HOSPADM

## 2022-12-12 RX ORDER — DEXAMETHASONE SODIUM PHOSPHATE 4 MG/ML
INJECTION, SOLUTION INTRA-ARTICULAR; INTRALESIONAL; INTRAMUSCULAR; INTRAVENOUS; SOFT TISSUE
Status: SHIPPED | OUTPATIENT
Start: 2022-12-12 | End: 2022-12-12

## 2022-12-12 RX ORDER — FLUMAZENIL 0.1 MG/ML
0.2 INJECTION INTRAVENOUS
Status: DISCONTINUED | OUTPATIENT
Start: 2022-12-12 | End: 2022-12-12 | Stop reason: HOSPADM

## 2022-12-12 RX ORDER — ONDANSETRON 2 MG/ML
4 INJECTION INTRAMUSCULAR; INTRAVENOUS ONCE
Status: DISCONTINUED | OUTPATIENT
Start: 2022-12-12 | End: 2022-12-12 | Stop reason: HOSPADM

## 2022-12-12 RX ORDER — FENTANYL CITRATE 50 UG/ML
INJECTION, SOLUTION INTRAMUSCULAR; INTRAVENOUS
Status: SHIPPED | OUTPATIENT
Start: 2022-12-12 | End: 2022-12-12

## 2022-12-12 RX ORDER — DEXTROSE 50 % IN WATER (D50W) INTRAVENOUS SYRINGE
25-50 AS NEEDED
Status: DISCONTINUED | OUTPATIENT
Start: 2022-12-12 | End: 2022-12-12 | Stop reason: HOSPADM

## 2022-12-12 RX ORDER — SODIUM CHLORIDE, SODIUM LACTATE, POTASSIUM CHLORIDE, CALCIUM CHLORIDE 600; 310; 30; 20 MG/100ML; MG/100ML; MG/100ML; MG/100ML
25 INJECTION, SOLUTION INTRAVENOUS CONTINUOUS
Status: DISCONTINUED | OUTPATIENT
Start: 2022-12-12 | End: 2022-12-12 | Stop reason: HOSPADM

## 2022-12-12 RX ORDER — NALOXONE HYDROCHLORIDE 0.4 MG/ML
0.1 INJECTION, SOLUTION INTRAMUSCULAR; INTRAVENOUS; SUBCUTANEOUS
Status: DISCONTINUED | OUTPATIENT
Start: 2022-12-12 | End: 2022-12-12 | Stop reason: HOSPADM

## 2022-12-12 RX ORDER — CELECOXIB 200 MG/1
400 CAPSULE ORAL
Status: DISCONTINUED | OUTPATIENT
Start: 2022-12-12 | End: 2022-12-12

## 2022-12-12 RX ORDER — DIPHENHYDRAMINE HYDROCHLORIDE 50 MG/ML
INJECTION, SOLUTION INTRAMUSCULAR; INTRAVENOUS AS NEEDED
Status: DISCONTINUED | OUTPATIENT
Start: 2022-12-12 | End: 2022-12-12 | Stop reason: HOSPADM

## 2022-12-12 RX ORDER — LIDOCAINE HYDROCHLORIDE 20 MG/ML
INJECTION, SOLUTION EPIDURAL; INFILTRATION; INTRACAUDAL; PERINEURAL AS NEEDED
Status: DISCONTINUED | OUTPATIENT
Start: 2022-12-12 | End: 2022-12-12 | Stop reason: HOSPADM

## 2022-12-12 RX ORDER — BUPIVACAINE HYDROCHLORIDE AND EPINEPHRINE 2.5; 5 MG/ML; UG/ML
INJECTION, SOLUTION EPIDURAL; INFILTRATION; INTRACAUDAL; PERINEURAL AS NEEDED
Status: DISCONTINUED | OUTPATIENT
Start: 2022-12-12 | End: 2022-12-12 | Stop reason: HOSPADM

## 2022-12-12 RX ORDER — FENTANYL CITRATE 50 UG/ML
50 INJECTION, SOLUTION INTRAMUSCULAR; INTRAVENOUS AS NEEDED
Status: DISCONTINUED | OUTPATIENT
Start: 2022-12-12 | End: 2022-12-12 | Stop reason: HOSPADM

## 2022-12-12 RX ORDER — ONDANSETRON 2 MG/ML
INJECTION INTRAMUSCULAR; INTRAVENOUS AS NEEDED
Status: DISCONTINUED | OUTPATIENT
Start: 2022-12-12 | End: 2022-12-12 | Stop reason: HOSPADM

## 2022-12-12 RX ORDER — MIDAZOLAM HYDROCHLORIDE 1 MG/ML
INJECTION, SOLUTION INTRAMUSCULAR; INTRAVENOUS
Status: SHIPPED | OUTPATIENT
Start: 2022-12-12 | End: 2022-12-12

## 2022-12-12 RX ORDER — MAGNESIUM SULFATE 100 %
4 CRYSTALS MISCELLANEOUS AS NEEDED
Status: DISCONTINUED | OUTPATIENT
Start: 2022-12-12 | End: 2022-12-12 | Stop reason: HOSPADM

## 2022-12-12 RX ADMIN — SODIUM CHLORIDE, POTASSIUM CHLORIDE, SODIUM LACTATE AND CALCIUM CHLORIDE 25 ML/HR: 600; 310; 30; 20 INJECTION, SOLUTION INTRAVENOUS at 10:28

## 2022-12-12 RX ADMIN — PROPOFOL 100 MG: 10 INJECTION, EMULSION INTRAVENOUS at 14:16

## 2022-12-12 RX ADMIN — MIDAZOLAM HYDROCHLORIDE 2 MG: 2 INJECTION, SOLUTION INTRAMUSCULAR; INTRAVENOUS at 13:10

## 2022-12-12 RX ADMIN — DEXAMETHASONE SODIUM PHOSPHATE 10 MG: 4 INJECTION, SOLUTION INTRA-ARTICULAR; INTRALESIONAL; INTRAMUSCULAR; INTRAVENOUS; SOFT TISSUE at 14:30

## 2022-12-12 RX ADMIN — DEXAMETHASONE SODIUM PHOSPHATE 8 MG: 4 INJECTION, SOLUTION INTRAMUSCULAR; INTRAVENOUS at 13:17

## 2022-12-12 RX ADMIN — Medication 10 MG: at 15:04

## 2022-12-12 RX ADMIN — SUGAMMADEX 200 MG: 100 INJECTION, SOLUTION INTRAVENOUS at 15:13

## 2022-12-12 RX ADMIN — DIPHENHYDRAMINE HYDROCHLORIDE 25 MG: 50 INJECTION, SOLUTION INTRAMUSCULAR; INTRAVENOUS at 14:38

## 2022-12-12 RX ADMIN — BUPIVACAINE HYDROCHLORIDE 20 ML: 5 INJECTION, SOLUTION EPIDURAL; INTRACAUDAL; PERINEURAL at 13:17

## 2022-12-12 RX ADMIN — ROCURONIUM BROMIDE 50 MG: 50 INJECTION, SOLUTION INTRAVENOUS at 14:17

## 2022-12-12 RX ADMIN — Medication 10 MG: at 14:28

## 2022-12-12 RX ADMIN — ONDANSETRON HYDROCHLORIDE 4 MG: 2 INJECTION, SOLUTION INTRAMUSCULAR; INTRAVENOUS at 14:48

## 2022-12-12 RX ADMIN — LIDOCAINE HYDROCHLORIDE 40 MG: 20 INJECTION, SOLUTION INTRAVENOUS at 14:16

## 2022-12-12 RX ADMIN — FAMOTIDINE 20 MG: 10 INJECTION, SOLUTION INTRAVENOUS at 14:38

## 2022-12-12 RX ADMIN — FENTANYL CITRATE 100 MCG: 50 INJECTION, SOLUTION INTRAMUSCULAR; INTRAVENOUS at 13:10

## 2022-12-12 NOTE — ANESTHESIA PREPROCEDURE EVALUATION
Relevant Problems   RENAL FAILURE   (+) Chronic kidney disease       Anesthetic History     PONV          Review of Systems / Medical History  Patient summary reviewed, nursing notes reviewed and pertinent labs reviewed    Pulmonary  Within defined limits                 Neuro/Psych   Within defined limits           Cardiovascular  Within defined limits                Exercise tolerance: >4 METS     GI/Hepatic/Renal         Renal disease: CRI       Endo/Other  Within defined limits           Other Findings              Physical Exam    Airway  Mallampati: II  TM Distance: 4 - 6 cm  Neck ROM: normal range of motion   Mouth opening: Normal     Cardiovascular  Regular rate and rhythm,  S1 and S2 normal,  no murmur, click, rub, or gallop  Rhythm: regular  Rate: normal         Dental  No notable dental hx       Pulmonary  Breath sounds clear to auscultation               Abdominal  GI exam deferred       Other Findings            Anesthetic Plan    ASA: 2  Anesthesia type: general and regional - interscalene block      Post-op pain plan if not by surgeon: peripheral nerve block single    Induction: Intravenous  Anesthetic plan and risks discussed with: Patient

## 2022-12-12 NOTE — OP NOTES
Operative Note    Patient: Kartik File MRN: [id number]  Surgery Date: 12/12/2022  [unfilled]          Procedure  Primary Surgeon    RIGHT SHOULDER ARTHROSCOPY/ DECOMPRESSIOIN/ STEPHANIE/ SSB/ RTC REPAIR/ EXT PHIL MITCHELL MD    * Panel 2 does not exist *  * Panel 2 does not exist *    * Panel 3 does not exist *  * Panel 3 does not exist *     Surgeon(s) and Role:     * Pavel Longoria MD - Primary    Other OR Staff/Assistants:  Circ-1: Madisyn Alcantar RN  Registered Nurse First Assistant: Lashanda Crowley Tech-1: Kojo Dinh, [address] [facility] Staff: Erin Escobar    1st Assistant Tasks:  Closing    Pre-operative Diagnosis:  Nontraumatic complete tear of right rotator cuff [M75.121]  Articular cartilage disorder of shoulder, 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 AC joint arthritis. Impingement. Synovitis. Labral fraying. Situs. Shoulder pain.     Complications: No    EBL: 20 CC    Implants:   Implants       Other    Putty Osteoselect c [id number] - [id number] - Implanted   (Right) Finger      Inventory item: Sami Lutz [id number] Model/Cat number: [id number]    Serial number: [id number] : [employer]      As of 9/26/2018       Status: Implanted                      Plate    Plate 3.H /0-F [facility] 227.893 - [id number] - Implanted   (Right) Finger      Inventory item: PLATE 3.H /7-H  [place] 598.882 Model/Cat number: [id number]    Lot number: NA        As of 9/26/2018       Status: Implanted                      Screw    Screw Cortex 1.3mm X 10mm 400 - [id number] - Implanted   (Right) Finger      Inventory item: SCREW CORTEX 1.3MM X 10MM  400.690 Model/Cat number: [id number]    Lot number: NA        As of 9/26/2018       Status: Implanted                      Screw Cortex 1.3mm X 11mm 400. - [id number] - Implanted   (Right) Finger      Inventory item: SCREW CORTEX 1.3MM X 11MM 400.691 Model/Cat number: [id number]    Lot number: NA        As of 9/26/2018       Status: Implanted                      Screw Cortex 2.0mm X 12mm 401. - [id number] - Implanted   (Right) Finger      Inventory item: SCREW CORTEX 2.0MM X 12MM [phone] Model/Cat number: [phone]    Lot number: NA        As of 9/26/2018       Status: Implanted                      Screw Cortex 2.0 X11mm 401.811 - [id number] - Implanted   (Right) Finger      Inventory item: SCREW CORTEX 2.0 x11MM [phone] Model/Cat number: [id number]    Lot number: NA        As of 9/26/2018       Status: Implanted                      Screw Cortex 2.0mm X 9mm 401.8 - [id number] - Implanted   (Right) Finger      Inventory item: SCREW CORTEX 2.0MM X 9MM [phone] Model/Cat number: [phone]    Lot number: NA        As of 9/26/2018       Status: Implanted                      Screw Cortex 2.0mm X 16mm 401.  - [id number] - Implanted   (Right) Finger      Inventory item: SCREW CORTEX 2.0MM X 16MM [phone] Model/Cat number: [phone]    Lot number: NA        As of 9/26/2018       Status: Implanted                      Type Not Specified    [place] Sut Dia5.5mm W/ Sz 3-0 Biocrylrapide Orthocord Sut - [id number] - Implanted   (Right) Shoulder      Inventory item: ANCHOR SUT DIA5.5MM W/ SZ 3-0 [place]elia [place] SUT Model/Cat number: [id number]    : [employer] Lot number: [id number]    Device identifier: [id number] Device identifier type: GS1      GUMATILDAD Information       Request status Successful        Brand name: ESTRELLA Velozdavid Giovana Version/Model: 14 [facility] name: Gracia MRI safety info as of 12/12/22: MR Safe    Contains dry or latex rubber: No      GMDN P.T. name: Tendon/ligament bone anchor, bioabsorbable                As of 12/12/2022       Status: Implanted                          l    Specimens: None    Operative procedure: Arthroscopic rotator cuff repair, decompression, Yamilka, extensive debridement, suprascapular block    The operative shoulder was prepped and draped in a sterile fashion after adequate anesthesia was given suprascapular nerve block was performed with 0.25% Sensorcaine 20 cc injected in the scapular notch for postop pain management. Operative time was performed. Standard posterior portal was made. Anterior lateral and anterior medial portals were made. Glenohumeral joint was visualized. Patient was found to have significant minor synovitis at that point extensive debridement was performed along with labral fraying debridement. There is no evidence of any pathology of the biceps. There is no evidence of any articular pathology. Subacromial space is approached next. There is a type III acromion at that point a partial acromioplasty was performed. There was AC joint arthritis. Distal clavicle resection of 1 cm was performed. There was evidence of a rotator cuff tear. That point the rotator cuff was debrided. An anchor(s) was placed was placed    And a primary repair of the rotator cuff was performed. At that point complete bursectomy and extensive debridement as performed. Portals were closed with 3-0 nylon. Patient was placed into a sling and [employer] [facility] extubated and transferred to PACU in stable condition.

## 2022-12-12 NOTE — ANESTHESIA PROCEDURE NOTES
Peripheral Block    Start time: 12/12/2022 1:12 PM  End time: 12/12/2022 1:17 PM  Performed by: Lazarus Gasmen, CRNA  Authorized by: Lazarus Gasmen, CRNA       Pre-procedure:    Indications: post-op pain management    Preanesthetic Checklist: patient identified, risks and benefits discussed, site marked, timeout performed, anesthesia consent given, patient being monitored and fire risk safety assessment completed and verbalized    Timeout Time: 13:10 FELIX Garcia RN)      Block Type:   Block Type:  Brachial plexus and interscalene  Laterality:  Right  Monitoring:  Standard ASA monitoring, continuous pulse ox, frequent vital sign checks, heart rate, oxygen and responsive to questions  Injection Technique:  Single shot  Procedures: ultrasound guided    Patient Position: supine  Prep: chlorhexidine    Location:  Interscalene  Needle Type:  Ultraplex  Needle Gauge:  21 G  Needle Localization:  Anatomical landmarks and ultrasound guidance  Medication Injected:  FentaNYL citrate (PF) injection sedation initial - IntraVENous   100 mcg - 12/12/2022 1:10:00 PM  midazolam (VERSED) injection - IntraVENous   2 mg - 12/12/2022 1:10:00 PM  bupivacaine (PF) (MARCAINE) 0.5% injection - Peripheral Nerve Block   20 mL - 12/12/2022 1:17:00 PM  dexamethasone (DECADRON) 4 mg/mL injection - Peripheral Nerve Block   8 mg - 12/12/2022 1:17:00 PM  Med Admin Time: 12/12/2022 1:17 PM    Assessment:  Number of attempts:  1  Injection Assessment:  Incremental injection every 5 mL, local visualized surrounding nerve on ultrasound, negative aspiration for blood, no intravascular symptoms, ultrasound image on chart and no paresthesia  Patient tolerance:  Patient tolerated the procedure well with no immediate complications

## 2022-12-12 NOTE — INTERVAL H&P NOTE
Update History & Physical    The Patient's History and Physical was reviewed with the patient. The patient was examined. There was no change. The surgical site was confirmed by the patient and me. Patient understands and wants to proceed with the procedure. If applicable, I have discussed with the patient / power of  the rationale for blood component transfusion; its benefits in treating or preventing fatigue, organ damage, or death; and its risk which includes mild transfusion reactions, rare risk of blood borne infection, or more serious but rare reactions. I have discussed the alternatives to transfusion, including the risk and consequences of not receiving transfusion. The patient / Tray Delgadoo of  had an opportunity to ask questions and had agreed to proceed with transfusion of blood components. Plan:  The risk, benefits, expected outcome, and alternative to the recommended procedure have been discussed with the patient.       Electronically signed by PAMELA Valverde on 12/12/2022 at 11:59 AM

## 2022-12-12 NOTE — INTERVAL H&P NOTE
Arthroscopic Surgery of the Shoulder #3 Post-op    1. Your first home we will should be clear liquids. 2.  An ice pack should be applied to the shoulder for at least 20 minutes 4 times a day or more often in the first 72 hours. Do not use heat. This may increase swelling and discomfort. 3.  Exercises are not necessary at this stage. You will be instructed on exercises during your visit to the office next week. 4.  A shoulder abduction pillow is provided for healing. Please wear it at all times. Do not try to hold it up without support. 5.  Your shoulder will be immobilized until your follow up appt. 6.  A prescription will be provided for pain before you are discharged. Do not take any aspirin for 1 week after surgery. Use plain Tylenol instead. Please inform the office of any known drug allergy. 7.  The dressing may be removed after 48 hours. Cover the incision with Band-Aids. 8.  You may shower 48 hours after surgery. Carefully dry the incisions and change Band-Aids daily after shower. 9.  You are not cleared to drive while your shoulder is immobilized. 10  Activities:  A. let pain be her guide to activity, too much pain equals too much activity  B. Some amount of swelling may be present postoperatively. Restrict activity if swelling is present. C.  Do not use exercise machines unless specifically instructed to. D.  Generally, if you have a job with little physical activity may return to work on the third postoperative day. E.  If your job requires excessive lifting or the use of your arm then discuss her return to work date with your provider. If you need refill of pain medication, call the office 2 business days prior to running out of medication. Please call during regular business hours, Medication refill requests will not be addressed during non-business hours. Please do not page the on-call provider for pain medication refills after hours.     If you have had an arthroscopic procedure you will be provided with with pictures. Please bring those pictures at the follow-up appointment. If you have a virtual appointment please have the pictures readily available during your virtual appointment. ICE THERAPY WRAP:    Keep ice therapy wrap on when resting. DO not wear when moving or walking. Ice packs are reusable. Ice Therapy wrap holds two ice packs at a time. Things to watch for:             Increased swelling of the surgical site             Spreading of redness around the incision site             Drainage of pus from the incision site             Developing a fever of 101.5 °F or higher that does not respond to Tylenol               If any of these symptoms occur you have any questions please contact our office at [phone]. If you need to talk to Dr. Saritha Gonzáles or his staff after hours please call the office and have the on-call service get in touch with the provider on call that day. Please note pain medications are not refilled after hours or on weekends. If Dr. Saritha Gonzáles or his staff do not call you back within 30 minutes. Please tell the  to try again.      Electronically signed by PAMELA Schneider on 12/12/2022 at 11:58 AM

## 2022-12-12 NOTE — ANESTHESIA POSTPROCEDURE EVALUATION
Procedure(s):  RIGHT SHOULDER ARTHROSCOPY/ DECOMPRESSIOIN/ STEPHANIE/ SSB/ RTC REPAIR/ EXT PHIL. general, regional    Anesthesia Post Evaluation      Multimodal analgesia: multimodal analgesia used between 6 hours prior to anesthesia start to PACU discharge  Patient location during evaluation: PACU  Patient participation: complete - patient participated  Level of consciousness: awake and alert  Pain management: adequate  Airway patency: patent  Anesthetic complications: no  Cardiovascular status: acceptable  Respiratory status: acceptable and room air  Hydration status: acceptable  Comments: Ok to discharge when standard criteria are met.    Post anesthesia nausea and vomiting:  none  Final Post Anesthesia Temperature Assessment:  Normothermia (36.0-37.5 degrees C)      INITIAL Post-op Vital signs:   Vitals Value Taken Time   /60 12/12/22 1527   Temp 36.4 °C (97.6 °F) 12/12/22 1527   Pulse 95 12/12/22 1527   Resp 21 12/12/22 1527   SpO2 97 % 12/12/22 1527

## 2022-12-12 NOTE — DISCHARGE INSTRUCTIONS
Arthroscopic Surgery of the Shoulder #3 Post-op    1. Your first home we will should be clear liquids. 2.  An ice pack should be applied to the shoulder for at least 20 minutes 4 times a day or more often in the first 72 hours. Do not use heat. This may increase swelling and discomfort. 3.  Exercises are not necessary at this stage. You will be instructed on exercises during your visit to the office next week. 4.  A shoulder abduction pillow is provided for healing. Please wear it at all times. Do not try to hold it up without support. 5.  Your shoulder will be immobilized until your follow up appt. 6.  A prescription will be provided for pain before you are discharged. Do not take any aspirin for 1 week after surgery. Use plain Tylenol instead. Please inform the office of any known drug allergy. 7.  The dressing may be removed after 48 hours. Cover the incision with Band-Aids. 8.  You may shower 48 hours after surgery. Carefully dry the incisions and change Band-Aids daily after shower. 9.  You are not cleared to drive while your shoulder is immobilized. 10  Activities:  A. let pain be her guide to activity, too much pain equals too much activity  B. Some amount of swelling may be present postoperatively. Restrict activity if swelling is present. C.  Do not use exercise machines unless specifically instructed to. D.  Generally, if you have a job with little physical activity may return to work on the third postoperative day. E.  If your job requires excessive lifting or the use of your arm then discuss her return to work date with your provider. If you need refill of pain medication, call the office 2 business days prior to running out of medication. Please call during regular business hours, Medication refill requests will not be addressed during non-business hours. Please do not page the on-call provider for pain medication refills after hours.     If you have had an arthroscopic procedure you will be provided with with pictures. Please bring those pictures at the follow-up appointment. If you have a virtual appointment please have the pictures readily available during your virtual appointment. ICE THERAPY WRAP:    Keep ice therapy wrap on when resting. DO not wear when moving or walking. Ice packs are reusable. Ice Therapy wrap holds two ice packs at a time. Things to watch for:             Increased swelling of the surgical site             Spreading of redness around the incision site             Drainage of pus from the incision site             Developing a fever of 101.5 °F or higher that does not respond to Tylenol               If any of these symptoms occur you have any questions please contact our office at [phone]. If you need to talk to Dr. Noelle Morris or his staff after hours please call the office and have the on-call service get in touch with the provider on call that day. Please note pain medications are not refilled after hours or on weekends. If Dr. Noelle Morris or his staff do not call you back within 30 minutes. Please tell the  to try again.

## 2022-12-15 ENCOUNTER — TELEPHONE (OUTPATIENT)
Dept: ORTHOPEDIC SURGERY | Age: 57
End: 2022-12-15

## 2022-12-23 ENCOUNTER — OFFICE VISIT (OUTPATIENT)
Dept: ORTHOPEDIC SURGERY | Age: 57
End: 2022-12-23
Payer: COMMERCIAL

## 2022-12-23 DIAGNOSIS — Z98.890 S/P RIGHT ROTATOR CUFF REPAIR: Primary | ICD-10-CM

## 2022-12-23 PROCEDURE — 99024 POSTOP FOLLOW-UP VISIT: CPT | Performed by: NURSE PRACTITIONER

## 2022-12-23 RX ORDER — OXYCODONE AND ACETAMINOPHEN 5; 325 MG/1; MG/1
1 TABLET ORAL
Qty: 30 TABLET | Refills: 0 | Status: SHIPPED | OUTPATIENT
Start: 2022-12-23 | End: 2022-12-31

## 2022-12-23 NOTE — PROGRESS NOTES
Name: Toshia Bueno    : 1965    Weight Loss Metrics 2022 2022 4/10/2019 2018 2018 2/3/2014 10/11/2013   Today's Wt 147 lb 8 oz 144 lb 136 lb 136 lb 128 lb 142 lb 3.2 oz 141 lb   BMI 23.1 kg/m2 22.55 kg/m2 21.3 kg/m2 21.3 kg/m2 20.05 kg/m2 22.27 kg/m2 22.08 kg/m2       There is no height or weight on file to calculate BMI. Service Dept: [address] [place] [address] and Sports Medicine    Patient's Pharmacies:    [id number] [place] [id number]  Phone: [phone] Fax: [phone]       Chief Complaint   Patient presents with    Surgical Follow-up    Shoulder Pain       HPI:  Patient follows up today for a postop recheck after a right shoulder arthroscopy with decompression, Yamilka, and rotator cuff repair. Surgery was done on 2022. She remains in her abduction sling. Visit Vitals  [facility] 2013      Allergies   Allergen Reactions    Pcn [Penicillins] Hives      Current Outpatient Medications   Medication Sig Dispense Refill    oxyCODONE-acetaminophen (Percocet) 5-325 mg per tablet Take 1 Tablet by mouth every six (6) hours as needed for Pain for up to 8 days. Max Daily Amount: 4 Tablets. Indications: pain 30 Tablet 0    ondansetron (ZOFRAN ODT) 4 mg disintegrating tablet Take 1 Tablet by mouth every eight (8) hours as needed for Nausea, Vomiting or Nausea or Vomiting for up to 20 doses.  20 Tablet 0      Patient Active Problem List   Diagnosis Code    Chronic kidney disease N18.9    Rotator cuff tear M75.100      Family History   Problem Relation Age of Onset    Diabetes Mother     Heart Disease Father     Cancer Maternal Grandfather     Heart Disease Maternal Grandfather     Diabetes Maternal Grandfather     Cancer Paternal Grandmother     Lung Disease Paternal Grandmother         TB      Social History     Socioeconomic History    Marital status:    Tobacco Use    Smoking status: Former     Types: Cigarettes     Quit date: 2010     Years since quittin.9    Smokeless tobacco: Never   Substance and Sexual Activity    Alcohol use: Yes     Alcohol/week: 3.0 standard drinks     Types: 3 Glasses of wine per week    Drug use: No    Sexual activity: Yes     Partners: Female   Social History Narrative    ** Merged History Encounter **           Past Surgical History:   Procedure Laterality Date    HX OPEN REDUCTION INTERNAL FIXATION      right middle finger    HX TONSILLECTOMY        Past Medical History:   Diagnosis Date    Acute frontal sinusitis     Adrenal mass (HCC)     Chronic kidney disease     Nausea & vomiting     Sciatica     Shortness of breath         I have reviewed and agree with PFS and ROS and intake form in chart and the record furthermore I have reviewed prior medical record(s) regarding this patients care during this appointment. Physical Exam:  On physical exam today the right shoulder portal incisions appear to be dry and intact with Prolene sutures. There is some mild resolving ecchymosis and the patient is neurovascularly intact in the right upper extremity. Encounter Diagnoses     ICD-10-CM ICD-9-CM   1. S/P right rotator cuff repair  Z98.890 V45.89       As part of continued conservative pain management options the patient was advised to utilize Tylenol or OTC NSAIDS as long as it is not medically contraindicated. Return to Office:   Today the Prolene sutures were removed. The patient will be referred to begin stage I physical therapy for the right shoulder and will continue using her abduction sling most of the time when she is up and mobile. I will refill her pain medication today and she will plan to follow-up in 4 more weeks for another postop recheck. We can do that visit virtually for her convenience and we will plan to have her discontinue use of the sling at that time and begin stage II rehab.          [address] [facility]

## 2023-01-03 ENCOUNTER — HOSPITAL ENCOUNTER (OUTPATIENT)
Dept: PHYSICAL THERAPY | Age: 58
Discharge: HOME | End: 2023-01-03
Attending: NURSE PRACTITIONER
Payer: COMMERCIAL

## 2023-01-03 PROCEDURE — 97162 PT EVAL MOD COMPLEX 30 MIN: CPT

## 2023-01-03 PROCEDURE — 97535 SELF CARE MNGMENT TRAINING: CPT

## 2023-01-03 PROCEDURE — 97140 MANUAL THERAPY 1/> REGIONS: CPT

## 2023-01-03 PROCEDURE — 97110 THERAPEUTIC EXERCISES: CPT

## 2023-01-03 NOTE — PROGRESS NOTES
[facility]  [address]  [facility], [address]  [phone] fax    Plan of Care/ Statement of Necessity for Physical Therapy Services    Patient name: Angelique Mendez Start of Care: 1/3/2023   Referral source: Jax Stephenson : 1965    Medical Diagnosis: Right shoulder pain [M25.511]  Payor: [employer] / Plan: [place] [employer] / Product Type: PPO /  Onset Date:2022    Treatment Diagnosis: Right shoulder pain s/p RC repair   Prior Hospitalization: see medical history Provider#: [id number]   Medications: Verified on Patient summary List    Comorbidities: Back pain   Prior Level of Function: Unlimited in ADL function, highly active, working full time as      The Plan of Care and following information is based on the information from the initial evaluation. Assessment/ key information: Patient is 61year old female s/p right rotator cuff repair on 2022. Initial injury occurred when she fell in 2022. Patient states she has been completing pendulum exercises at home and wears the sling as prescribed. On evaluation patient is 3 weeks out of surgery. She shows significant limitations in right shoulder PROM and soft tissue dysfunction around scapula. Patient was provided with an HEP to complete 3-4x daily. She is an excellent candidate for physical therapy to return to [facility]. Evaluation Complexity History MEDIUM  Complexity : 1-2 comorbidities / personal factors will impact the outcome/ POC ; Examination MEDIUM Complexity : 3 Standardized tests and measures addressing body structure, function, activity limitation and / or participation in recreation  ;Presentation MEDIUM Complexity : Evolving with changing characteristics  ; Clinical Decision Making MEDIUM Complexity : FOTO score of 26-74  Overall Complexity Rating: MEDIUM  Problem List: pain affecting function, decrease ROM, decrease strength, decrease ADL/ functional abilitiies, decrease activity tolerance, and decrease flexibility/ joint mobility   Treatment Plan may include any combination of the following: Therapeutic exercise, Neuromuscular reeducation, Manual therapy, Therapeutic activity, Self care/home management, and Vasopneumatic device  Patient / Family readiness to learn indicated by: asking questions, trying to perform skills, and interest  Persons(s) to be included in education: patient (P)  Barriers to Learning/Limitations: None  Patient Goal (s): Less pain, more mobility and range of motion  Patient Self Reported Health Status: excellent  Rehabilitation Potential: good    Short Term Goals: To be accomplished in 2 weeks:  Patient to be independent in HEP to maximize therapeutic effect of care plan. Patient to achieve 80 deg right shoulder flexion and scaption PROM to allow progression through protocol for increased independence in ADLs     Long Term Goals: To be accomplished in 4 weeks:  Patient to achieve 120 deg right shoulder flexion PROM to allow progression through protocol for increased independence in self care tasks. Patient to achieve 100 deg right shoulder scaption PROM to allow progression through protocol for increased independence in ADLs    Frequency / Duration: Patient to be seen 2-3 times per week for 4 weeks. Patient/ Caregiver education and instruction: Diagnosis, prognosis, self care, activity modification, and exercises   [x]  Plan of care has been reviewed with PTA    Medardo Sainz, PT 1/3/2023 2:28 PM    ________________________________________________________________________    I certify that the above Therapy Services are being furnished while the patient is under my care. I agree with the treatment plan and certify that this therapy is necessary.     Physician's Signature:____________Date:_________TIME:________     PAMELA Broderick  ** Signature, Date and Time must be completed for valid certification **    Please sign and return to [facility] [address] [place] [address] & [address] [facility] [address]  [address] [address] [address]  [phone] fax

## 2023-01-03 NOTE — PROGRESS NOTES
PT DAILY TREATMENT NOTE     Patient Name: Domingo Lutz  Date:1/3/2023  : 1965  [x]  Patient  Verified  Payor: BLUE CROSS / Plan: [facility] / Product Type: PPO /    In time:2:30  Out time: 3:21 PM  Total Treatment Time (min): 51  Visit #: 1 of     Medicare/BCBS Only   Total Timed Codes (min):  43 1:1 Treatment Time:  51       Treatment Area: Right shoulder pain [M25.511]    SUBJECTIVE  Pain Level (0-10 scale): 8  Any medication changes, allergies to medications, adverse drug reactions, diagnosis change, or new procedure performed?: [x] No    [] Yes (see summary sheet for update)  Subjective functional status/changes:   [] No changes reported  Agreeable to initial evaluation    OBJECTIVE    8 min [x]Eval                  []Re-Eval       20 min Therapeutic Exercise:  HEP instruction   Rationale: increase ROM and increase strength to improve the patients ability to perform ADLs. 8 min Manual Therapy:    Sidelying - right STJ scapular clocks with STM to rhomboids  Supine - PROM with Grade I oscillatory mobilization for analgesic effect, STM to right bicep   The manual therapy interventions were performed at a separate and distinct time from the therapeutic activities interventions. Rationale: decrease pain, increase ROM, and increase tissue extensibility to progress through protocol. 15 min Self Care/Home Management: Prognosis and time to full recovery, review of precautions and contraindications regarding right UE active use   Rationale:  Increase knowledge and carryover   to improve the patients ability to manage condition independently.           With   [] TE   [] TA   [] neuro   [] other: Patient Education: [x] Review HEP    [] Progressed/Changed HEP based on:   [] positioning   [] body mechanics   [] transfers   [] heat/ice application    [] other:      Other Objective/Functional Measures: See Evaluation     Pain Level (0-10 scale) post treatment: 8    ASSESSMENT/Changes in Function: Patient is 61year old female s/p right rotator cuff repair on 12/12/2022. Initial injury occurred when she fell in November 2022. Patient states she has been completing pendulum exercises at home and wears the sling as prescribed. On evaluation patient is 3 weeks out of surgery. She shows significant limitations in right shoulder PROM and soft tissue dysfunction around scapula. Patient was provided with an HEP to complete 3-4x daily. She is an excellent candidate for physical therapy to return to [facility]. [x]  See Plan of Care  []  See progress note/recertification  []  See Discharge Summary         Progress towards goals / Updated goals:  Short Term Goals: To be accomplished in 2 weeks:  Patient to be independent in HEP to maximize therapeutic effect of care plan. Evaluation: initiated and instructed  Patient to achieve 80 deg right shoulder flexion and scaption PROM to allow progression through protocol for increased independence in ADLs   Evaluation: 43 deg flexion, 38 deg scaption     Long Term Goals: To be accomplished in 4 weeks:  Patient to achieve 120 deg right shoulder flexion PROM to allow progression through protocol for increased independence in self care tasks.   Evaluation: 43 deg flexion  Patient to achieve 100 deg right shoulder scaption PROM to allow progression through protocol for increased independence in ADLs  Evaluation: 38 deg scaption    PLAN  [x]  Upgrade activities as tolerated     []  Continue plan of care  []  Update interventions per flow sheet       []  Discharge due to:_  []  Other:_      Deborrah Sacks, PT 1/3/2023  2:31 PM    Future Appointments   Date Time Provider Domingo Isaacs   1/19/2023  9:00 AM PAMELA Mcfadden BS AMB   1/26/2023  3:30 PM MD Deirdre Herrmann

## 2023-01-04 ENCOUNTER — TELEPHONE (OUTPATIENT)
Dept: PHYSICAL THERAPY | Age: 58
End: 2023-01-04

## 2023-01-04 DIAGNOSIS — Z98.890 S/P RIGHT ROTATOR CUFF REPAIR: Primary | ICD-10-CM

## 2023-01-09 ENCOUNTER — APPOINTMENT (OUTPATIENT)
Dept: PHYSICAL THERAPY | Age: 58
End: 2023-01-09
Attending: NURSE PRACTITIONER
Payer: COMMERCIAL

## 2023-01-09 ENCOUNTER — TELEPHONE (OUTPATIENT)
Dept: PHYSICAL THERAPY | Age: 58
End: 2023-01-09

## 2023-01-19 ENCOUNTER — VIRTUAL VISIT (OUTPATIENT)
Dept: ORTHOPEDIC SURGERY | Age: 58
End: 2023-01-19
Payer: COMMERCIAL

## 2023-01-19 DIAGNOSIS — Z98.890 S/P RIGHT ROTATOR CUFF REPAIR: Primary | ICD-10-CM

## 2023-01-19 PROCEDURE — 99024 POSTOP FOLLOW-UP VISIT: CPT | Performed by: NURSE PRACTITIONER

## 2023-01-19 NOTE — PROGRESS NOTES
Name: Magdalena Logan    : 1965   Weight Loss Metrics 2022 2022 4/10/2019 2018 2018 2/3/2014 10/11/2013   Today's Wt 147 lb 8 oz 144 lb 136 lb 136 lb 128 lb 142 lb 3.2 oz 141 lb   BMI 23.1 kg/m2 22.55 kg/m2 21.3 kg/m2 21.3 kg/m2 20.05 kg/m2 22.27 kg/m2 22.08 kg/m2       There is no height or weight on file to calculate BMI. Service Dept: [address] [place] [address] and Sports Medicine    Patient's Pharmacies:    [address] [place] [id number]  Phone: [phone] Fax: [phone]       Chief Complaint   Patient presents with    Shoulder Pain        HPI:  Patient follows up today for a postop recheck after right shoulder arthroscopy with decompression, Yamilka, and rotator cuff repair done on 2022. She remains in her abduction sling and has been progressing with stage I PT. Visit Vitals  LMP 2013        Allergies   Allergen Reactions    Pcn [Penicillins] Hives        Current Outpatient Medications   Medication Sig Dispense Refill    ondansetron (ZOFRAN ODT) 4 mg disintegrating tablet Take 1 Tablet by mouth every eight (8) hours as needed for Nausea, Vomiting or Nausea or Vomiting for up to 20 doses.  20 Tablet 0        Patient Active Problem List   Diagnosis Code    Chronic kidney disease N18.9    Rotator cuff tear M75.100        Family History   Problem Relation Age of Onset    Diabetes Mother     Heart Disease Father     Cancer Maternal Grandfather     Heart Disease Maternal Grandfather     Diabetes Maternal Grandfather     Cancer Paternal Grandmother     Lung Disease Paternal Grandmother         TB        Social History     Socioeconomic History    Marital status:    Tobacco Use    Smoking status: Former     Types: Cigarettes     Quit date: 2010     Years since quittin.0    Smokeless tobacco: Never   Substance and Sexual Activity    Alcohol use: Yes     Alcohol/week: 3.0 standard drinks     Types: 3 Glasses of wine per week    Drug use: No    Sexual activity: Yes     Partners: Female   Social History Narrative    ** Merged History Encounter **             Past Surgical History:   Procedure Laterality Date    HX OPEN REDUCTION INTERNAL FIXATION      right middle finger    HX TONSILLECTOMY          Past Medical History:   Diagnosis Date    Acute frontal sinusitis     Adrenal mass (HCC)     Chronic kidney disease     Nausea & vomiting     Sciatica     Shortness of breath         I have reviewed and agree with PFSH and ROS and intake form in chart and the record. Review of Systems:   Patient is pleasant appearing individual, appropriately dressed, well hydrated, well nourished, who is alert, appropriately oriented for age, and in no acute distress with a normal gait andnotmal affect who does not appear to be in any significant pain. Physical Exam:  On physical exam today patient demonstrates forward elevation and abduction to about 85 degrees. Encounter Diagnoses     ICD-10-CM ICD-9-CM   1. S/P right rotator cuff repair  Z98.890 V45.89       Magdalena Logan, who was evaluated through a synchronous (real-time) audio-video encounter, and/or her healthcare decision maker, is aware that it is a billable service, with coverage as determined by her insurance carrier. She provided verbal consent to proceed: Yes, and patient identification was verified. It was conducted pursuant to the emergency declaration under the [address] [place] [address] authority and the [employer] Act. A caregiver was present when appropriate. Ability to conduct physical exam was limited. I was in the office. The patient was in the officeat her work.     Return to Office:   Today I recommended to the patient that she could discontinue use of the sling and advance to full active range of motion as tolerated. She will avoid any weightbearing with the right upper extremity over 2 to 3 pounds. We will begin stage II PT and she will follow-up again in 6 more weeks on a virtual visit to reassess her status. We will plan to have her into her stage III PT for strengthening at that time.

## 2023-02-09 ENCOUNTER — HOSPITAL ENCOUNTER (OUTPATIENT)
Age: 58
Discharge: HOME | End: 2023-02-09
Attending: PSYCHIATRY & NEUROLOGY
Payer: COMMERCIAL

## 2023-02-09 ENCOUNTER — TRANSCRIBE ORDER (OUTPATIENT)
Dept: SCHEDULING | Age: 58
End: 2023-02-09

## 2023-02-09 DIAGNOSIS — M75.01 ADHESIVE CAPSULITIS OF RIGHT SHOULDER: ICD-10-CM

## 2023-02-09 DIAGNOSIS — M75.01 ADHESIVE CAPSULITIS OF RIGHT SHOULDER: Primary | ICD-10-CM

## 2023-02-09 PROCEDURE — 73221 MRI JOINT UPR EXTREM W/O DYE: CPT

## 2023-12-22 ENCOUNTER — HOSPITAL ENCOUNTER (OUTPATIENT)
Facility: HOSPITAL | Age: 58
Discharge: HOME | End: 2023-12-25
Payer: COMMERCIAL

## 2023-12-22 DIAGNOSIS — J92.9 PLEURAL THICKENING: ICD-10-CM

## 2023-12-22 DIAGNOSIS — R91.8 PULMONARY NODULES: ICD-10-CM

## 2023-12-22 PROCEDURE — 71250 CT THORAX DX C-: CPT

## 2024-04-03 LAB — LDL CHOLESTEROL, EXTERNAL: 110

## 2024-04-09 ENCOUNTER — OFFICE VISIT (OUTPATIENT)
Age: 59
End: 2024-04-09
Payer: COMMERCIAL

## 2024-04-09 VITALS
WEIGHT: 152 LBS | OXYGEN SATURATION: 99 % | BODY MASS INDEX: 23.86 KG/M2 | SYSTOLIC BLOOD PRESSURE: 107 MMHG | HEIGHT: 67 IN | HEART RATE: 62 BPM | DIASTOLIC BLOOD PRESSURE: 66 MMHG

## 2024-04-09 DIAGNOSIS — R07.9 CHEST PAIN, UNSPECIFIED TYPE: ICD-10-CM

## 2024-04-09 DIAGNOSIS — I25.84 CORONARY ARTERY CALCIFICATION: Primary | ICD-10-CM

## 2024-04-09 DIAGNOSIS — I25.10 CORONARY ARTERY CALCIFICATION: Primary | ICD-10-CM

## 2024-04-09 DIAGNOSIS — E78.00 PURE HYPERCHOLESTEROLEMIA: ICD-10-CM

## 2024-04-09 PROCEDURE — 99203 OFFICE O/P NEW LOW 30 MIN: CPT | Performed by: INTERNAL MEDICINE

## 2024-04-09 RX ORDER — ROSUVASTATIN CALCIUM 10 MG/1
10 TABLET, COATED ORAL DAILY
COMMUNITY

## 2024-04-09 ASSESSMENT — PATIENT HEALTH QUESTIONNAIRE - PHQ9
SUM OF ALL RESPONSES TO PHQ QUESTIONS 1-9: 0
SUM OF ALL RESPONSES TO PHQ QUESTIONS 1-9: 0
2. FEELING DOWN, DEPRESSED OR HOPELESS: NOT AT ALL
SUM OF ALL RESPONSES TO PHQ QUESTIONS 1-9: 0
SUM OF ALL RESPONSES TO PHQ9 QUESTIONS 1 & 2: 0
1. LITTLE INTEREST OR PLEASURE IN DOING THINGS: NOT AT ALL
SUM OF ALL RESPONSES TO PHQ QUESTIONS 1-9: 0

## 2024-04-09 ASSESSMENT — ENCOUNTER SYMPTOMS
EYES NEGATIVE: 1
GASTROINTESTINAL NEGATIVE: 1
RESPIRATORY NEGATIVE: 1

## 2024-04-09 NOTE — PROGRESS NOTES
1. Have you been to the ER, urgent care clinic since your last visit?  Hospitalized since your last visit?    no    2.  Where do you normally have your labs drawn?   [facility]/ [facility]    3. Do you need any refills today?   no    4. Which local pharmacy do you use (enter pharmacy)?   [facility]    5. Which mail order pharmacy do you use (enter pharmacy)?   no     6. Are you here for surgical clearance and if so who will be doing your     procedure/surgery (care team)?   no

## 2024-04-09 NOTE — PROGRESS NOTES
Josie Hernandez is a 58 y.o. year old female.    4/9/2024 seen as a new patient for coronary artery calcification and hyperlipidemia.  History of chest discomfort few times at night in the last few months which wakes her up.  She takes Tums and aspirin and goes back to sleep.  Has happened once during the day without any definite relation to activity.  No definite radiation or associated symptoms.  No significant dyspnea but has no regular exercise habits.  However, she can bike slowly when she [place] beach house for for almost an hour.  No edema dizziness or palpitations.          Review of Systems   Constitutional: Negative.    HENT: Negative.     Eyes: Negative.    Respiratory: Negative.     Cardiovascular:  Positive for chest pain.   Gastrointestinal: Negative.    Endocrine: Negative.    Genitourinary: Negative.    Musculoskeletal: Negative.    Neurological: Negative.    Psychiatric/Behavioral: Negative.     All other systems reviewed and are negative.        Physical Exam  Vitals and nursing note reviewed.   Constitutional:       Appearance: Normal appearance.   HENT:      Head: Normocephalic and atraumatic.      Nose: Nose normal.   Eyes:      Conjunctiva/sclera: Conjunctivae normal.   Cardiovascular:      Rate and Rhythm: Normal rate and regular rhythm.      Pulses: Normal pulses.      Heart sounds: Normal heart sounds.   Pulmonary:      Effort: Pulmonary effort is normal.      Breath sounds: Normal breath sounds.   Abdominal:      General: Bowel sounds are normal.      Palpations: Abdomen is soft.   Musculoskeletal:         General: Normal range of motion.      Right lower leg: No edema.      Left lower leg: No edema.   Skin:     General: Skin is warm and dry.   Neurological:      General: No focal deficit present.      Mental Status: She is alert and oriented to person, place, and time.   Psychiatric:         Mood and Affect: Mood normal.         Behavior: Behavior normal.        Allergies

## 2024-04-15 ENCOUNTER — HOSPITAL ENCOUNTER (OUTPATIENT)
Facility: HOSPITAL | Age: 59
Discharge: HOME | End: 2024-04-18
Attending: INTERNAL MEDICINE

## 2024-04-15 DIAGNOSIS — I25.84 CORONARY ARTERY CALCIFICATION: ICD-10-CM

## 2024-04-15 DIAGNOSIS — I25.10 CORONARY ARTERY CALCIFICATION: ICD-10-CM

## 2024-04-15 PROCEDURE — 75571 CT HRT W/O DYE W/CA TEST: CPT

## 2024-04-18 ENCOUNTER — TELEPHONE (OUTPATIENT)
Age: 59
End: 2024-04-18

## 2024-04-18 DIAGNOSIS — I25.84 CORONARY ARTERY CALCIFICATION: ICD-10-CM

## 2024-04-18 DIAGNOSIS — I25.10 CORONARY ARTERY CALCIFICATION: ICD-10-CM

## 2024-04-18 DIAGNOSIS — E78.00 PURE HYPERCHOLESTEROLEMIA: Primary | ICD-10-CM

## 2024-04-18 NOTE — TELEPHONE ENCOUNTER
Spoke with patient per Dr. Oliver regarding calcium score test. Test reviewed. Please contact patient and do the following asap     Calcium score is high.  Let her know do not forget to repeat lipids and LFTs.  Goal LDL less than 70. She voices understanding and acceptance of this advice and will call back if any further questions or concerns.

## 2024-04-18 NOTE — RESULT ENCOUNTER NOTE
Please contact patient and do the following asap    Calcium score is high.  Let her know do not forget to repeat lipids and LFTs.  Goal LDL less than 70

## 2024-04-18 NOTE — TELEPHONE ENCOUNTER
----- Message from Jose Manuel Oliver MD sent at 4/17/2024 11:49 PM EDT -----  Please contact patient and do the following asap    Calcium score is high.  Let her know do not forget to repeat lipids and LFTs.  Goal LDL less than 70

## 2024-07-11 ENCOUNTER — HOSPITAL ENCOUNTER (OUTPATIENT)
Facility: HOSPITAL | Age: 59
Discharge: HOME | End: 2024-07-11
Payer: COMMERCIAL

## 2024-07-11 VITALS — BODY MASS INDEX: 23.78 KG/M2 | HEIGHT: 67 IN

## 2024-07-11 DIAGNOSIS — Z12.31 BREAST CANCER SCREENING BY MAMMOGRAM: ICD-10-CM

## 2024-07-11 PROCEDURE — 77063 BREAST TOMOSYNTHESIS BI: CPT

## (undated) DEVICE — COUNTER NDL 40 COUNT HLD 70 FOAM BLK ADH W/ MAG

## (undated) DEVICE — TOWEL,OR,DSP,ST,BLUE,STD,4/PK,20PK/CS: Brand: MEDLINE

## (undated) DEVICE — CLEAR-TRAC THREADED CANNULA WITH                                    OBTURATOR 5 MM X 76 MM, LATEX FREE,                                    BOX OF 10: Brand: CLEAR-TRAC

## (undated) DEVICE — STOCKINETTE ORTHOPEDIC IMPERV 36X9 IN STRL

## (undated) DEVICE — GLOVE SURG SZ 75 L12IN FNGR THK79MIL GRN LTX FREE

## (undated) DEVICE — DRAPE SHLDR 172X100IN STRL

## (undated) DEVICE — 4.5 FULL RADIUS BONECUTTER BLADES,                                    YELLOW, 8000 MAXIMUM RPM, PACKAGED 6                                    PER BOX, STERILE

## (undated) DEVICE — GLOVE SURG SZ 65 THK91MIL LTX FREE SYN POLYISOPRENE

## (undated) DEVICE — (D)PREP SKN CHLRAPRP APPL 26ML -- CONVERT TO ITEM 371833

## (undated) DEVICE — SUTURE ETHLN SZ 2-0 L18IN NONABSORBABLE BLK L26MM FS 3/8 664G

## (undated) DEVICE — 3M™ IOBAN™ 2 ANTIMICROBIAL INCISE DRAPE 6650EZ: Brand: IOBAN™ 2

## (undated) DEVICE — TAPE ADH W2INXL10YD WHT PAPR GENTLE BRTH FLX COMFORTABLE

## (undated) DEVICE — SPONGE GZ 4X4 IN 16-PLY DETECTABLE W/ DMT MSTR TAG

## (undated) DEVICE — GEL WRAP SHOULDER WITH 4PK

## (undated) DEVICE — T-MAX DISPOSABLE FACE MASK 8 PER BOX

## (undated) DEVICE — 4-PORT MANIFOLD: Brand: NEPTUNE 2

## (undated) DEVICE — GLOVE ORANGE PI 7   MSG9070

## (undated) DEVICE — INTENDED FOR TISSUE SEPARATION, AND OTHER PROCEDURES THAT REQUIRE A SHARP SURGICAL BLADE TO PUNCTURE OR CUT.: Brand: BARD-PARKER ® STAINLESS STEEL BLADES

## (undated) DEVICE — 3M™ COBAN™ NL STERILE NON-LATEX SELF-ADHERENT WRAP, 2086S, 6 IN X 5 YD (15 CM X 4,5 M), 12 ROLLS/CASE: Brand: 3M™ COBAN™

## (undated) DEVICE — COVER,MAYO STAND,STERILE: Brand: MEDLINE

## (undated) DEVICE — GAUZE SPNG AVANT 4X4 4PLY NS --

## (undated) DEVICE — NDL SPNE QNCKE 18GX3.5IN LF --

## (undated) DEVICE — UNDERGLOVE SURG SZ 7.5 BLU LTX FREE SYN POLYISOPRENE

## (undated) DEVICE — DRAPE,TOP,102X53,STERILE: Brand: MEDLINE

## (undated) DEVICE — TUBING, SUCTION, 9/32" X 10', STRAIGHT: Brand: MEDLINE

## (undated) DEVICE — 4.5 MM HELICUT STRAIGHT BURRS,                                    POWER/EP-1, SLATE, 5000 MAXIMUM RPM,                                    PACKAGED 6 PER BOX, STERILE: Brand: DYONICS HELICUT

## (undated) DEVICE — GARMENT COMPR M FOR 13IN FT INTMIT SGL BLDR HEM FORC II

## (undated) DEVICE — COVER,TABLE,HEAVY DUTY,77"X90",STRL: Brand: MEDLINE

## (undated) DEVICE — PAD,ABDOMINAL,5"X9",STERILE,LF,1/PK: Brand: MEDLINE INDUSTRIES, INC.

## (undated) DEVICE — INFLOW CASSETTE TUBING, DO NOT USE IF PACKAGE IS DAMAGED: Brand: CROSSFLOW

## (undated) DEVICE — SYR LR LCK 1ML GRAD NSAF 30ML --

## (undated) DEVICE — GOWN,PRECEPT, XLNG/XLRG ,STRL: Brand: MEDLINE

## (undated) DEVICE — GRASPER SUT 60DEG SHRP TIP LO PROF FOR RAP ACCS IN SHLDR

## (undated) DEVICE — 1000 S-DRAPE TOWEL DRAPE 10/BX: Brand: STERI-DRAPE™

## (undated) DEVICE — SHEET,DRAPE,70X100,STERILE: Brand: MEDLINE

## (undated) DEVICE — GOWN,AURORA,FABRIC-REINFORCED,X-LARGE: Brand: MEDLINE

## (undated) DEVICE — GLOVE SURG 7 BIOGEL PI ULTRATOUCH G

## (undated) DEVICE — DRESSING,GAUZE,XEROFORM,CURAD,1"X8",ST: Brand: CURAD